# Patient Record
Sex: FEMALE | Race: WHITE | NOT HISPANIC OR LATINO | Employment: OTHER | URBAN - METROPOLITAN AREA
[De-identification: names, ages, dates, MRNs, and addresses within clinical notes are randomized per-mention and may not be internally consistent; named-entity substitution may affect disease eponyms.]

---

## 2023-02-03 PROBLEM — S29.9XXA RIB INJURY: Status: ACTIVE | Noted: 2023-02-03

## 2023-02-03 PROBLEM — L98.9 SKIN LESION OF NECK: Status: ACTIVE | Noted: 2023-02-03

## 2023-02-03 PROBLEM — B37.2 CANDIDAL INTERTRIGO: Status: ACTIVE | Noted: 2023-02-03

## 2023-02-03 PROBLEM — I10 BENIGN ESSENTIAL HYPERTENSION: Status: ACTIVE | Noted: 2023-02-03

## 2023-02-03 PROBLEM — G50.0 TRIGEMINAL NEURALGIA: Status: ACTIVE | Noted: 2023-02-03

## 2023-02-03 PROBLEM — R05.9 COUGH: Status: ACTIVE | Noted: 2023-02-03
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
## 2023-02-03 PROBLEM — H40.9 GLAUCOMA: Status: ACTIVE | Noted: 2023-02-03

## 2023-02-03 PROBLEM — M54.2 NECK PAIN: Status: ACTIVE | Noted: 2023-02-03

## 2023-02-03 PROBLEM — R07.89 ATYPICAL CHEST PAIN: Status: ACTIVE | Noted: 2023-02-03

## 2023-02-03 PROBLEM — M25.519 SHOULDER PAIN: Status: ACTIVE | Noted: 2023-02-03

## 2023-02-03 PROBLEM — L90.5 SCARRING: Status: ACTIVE | Noted: 2023-02-03
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
## 2023-02-03 PROBLEM — G54.2 CERVICAL NEUROPATHY: Status: ACTIVE | Noted: 2023-02-03

## 2023-02-03 PROBLEM — E78.5 HYPERLIPIDEMIA: Status: ACTIVE | Noted: 2023-02-03

## 2023-02-03 PROBLEM — H26.9 RIGHT CATARACT: Status: ACTIVE | Noted: 2023-02-03

## 2023-02-03 PROBLEM — E16.2 HYPOGLYCEMIA: Status: ACTIVE | Noted: 2023-02-03

## 2023-02-03 PROBLEM — M54.2 NECK PAIN ON LEFT SIDE: Status: ACTIVE | Noted: 2023-02-03

## 2023-02-03 PROBLEM — L98.9 NODULAR LESION ON SURFACE OF SKIN: Status: ACTIVE | Noted: 2023-02-03

## 2023-02-03 PROBLEM — M81.0 POSTMENOPAUSAL BONE LOSS: Status: ACTIVE | Noted: 2023-02-03

## 2023-02-03 PROBLEM — I25.10 ASHD (ARTERIOSCLEROTIC HEART DISEASE): Status: ACTIVE | Noted: 2023-02-03

## 2023-02-03 PROBLEM — R94.39 ABNORMAL STRESS TEST: Status: ACTIVE | Noted: 2023-02-03

## 2023-02-03 PROBLEM — M85.80 OSTEOPENIA: Status: ACTIVE | Noted: 2023-02-03

## 2023-02-03 PROBLEM — E55.9 VITAMIN D DEFICIENCY: Status: ACTIVE | Noted: 2023-02-03

## 2023-02-03 PROBLEM — H61.21 HEARING LOSS DUE TO CERUMEN IMPACTION, RIGHT: Status: ACTIVE | Noted: 2023-02-03

## 2023-02-03 PROBLEM — E78.5 HYPERLIPEMIA: Status: ACTIVE | Noted: 2023-02-03

## 2023-02-03 PROBLEM — M81.0 OSTEOPOROSIS: Status: ACTIVE | Noted: 2023-02-03

## 2023-02-03 PROBLEM — H61.23 BILATERAL IMPACTED CERUMEN: Status: ACTIVE | Noted: 2023-02-03

## 2023-02-03 PROBLEM — R73.9 BORDERLINE HYPERGLYCEMIA: Status: ACTIVE | Noted: 2023-02-03

## 2023-02-03 RX ORDER — LATANOPROST 50 UG/ML
SOLUTION/ DROPS OPHTHALMIC
COMMUNITY

## 2023-02-03 RX ORDER — CALCIUM CARBONATE/VITAMIN D3 600MG-5MCG
3 TABLET ORAL DAILY
COMMUNITY
Start: 2017-08-08

## 2023-02-03 RX ORDER — ATORVASTATIN CALCIUM 40 MG/1
1 TABLET, FILM COATED ORAL DAILY
COMMUNITY
Start: 2017-08-21 | End: 2023-03-17 | Stop reason: SDUPTHER

## 2023-02-03 RX ORDER — TIMOLOL MALEATE 5 MG/ML
SOLUTION/ DROPS OPHTHALMIC
COMMUNITY
End: 2024-04-02 | Stop reason: WASHOUT

## 2023-02-03 RX ORDER — AMLODIPINE BESYLATE 5 MG/1
1 TABLET ORAL DAILY
COMMUNITY
Start: 2017-08-21 | End: 2023-03-17 | Stop reason: SDUPTHER

## 2023-02-03 RX ORDER — OMEGA-3 FATTY ACIDS 1000 MG
1000 CAPSULE ORAL DAILY
COMMUNITY

## 2023-02-03 RX ORDER — ALENDRONATE SODIUM 70 MG/1
1 TABLET ORAL
COMMUNITY
Start: 2018-11-05 | End: 2023-08-22 | Stop reason: SDUPTHER

## 2023-03-08 ENCOUNTER — TELEPHONE (OUTPATIENT)
Dept: PRIMARY CARE | Facility: CLINIC | Age: 83
End: 2023-03-08
Payer: MEDICARE

## 2023-03-08 LAB
ALANINE AMINOTRANSFERASE (SGPT) (U/L) IN SER/PLAS: 14 U/L (ref 7–45)
ALBUMIN (G/DL) IN SER/PLAS: 4.1 G/DL (ref 3.4–5)
ALKALINE PHOSPHATASE (U/L) IN SER/PLAS: 77 U/L (ref 33–136)
ANION GAP IN SER/PLAS: 9 MMOL/L (ref 10–20)
ASPARTATE AMINOTRANSFERASE (SGOT) (U/L) IN SER/PLAS: 17 U/L (ref 9–39)
BASOPHILS (10*3/UL) IN BLOOD BY AUTOMATED COUNT: 0.08 X10E9/L (ref 0–0.1)
BASOPHILS/100 LEUKOCYTES IN BLOOD BY AUTOMATED COUNT: 0.9 % (ref 0–2)
BILIRUBIN TOTAL (MG/DL) IN SER/PLAS: 0.6 MG/DL (ref 0–1.2)
CALCIDIOL (25 OH VITAMIN D3) (NG/ML) IN SER/PLAS: 53 NG/ML
CALCIUM (MG/DL) IN SER/PLAS: 9.3 MG/DL (ref 8.6–10.3)
CARBON DIOXIDE, TOTAL (MMOL/L) IN SER/PLAS: 30 MMOL/L (ref 21–32)
CHLORIDE (MMOL/L) IN SER/PLAS: 104 MMOL/L (ref 98–107)
CHOLESTEROL (MG/DL) IN SER/PLAS: 107 MG/DL (ref 0–199)
CHOLESTEROL IN HDL (MG/DL) IN SER/PLAS: 42.6 MG/DL
CHOLESTEROL/HDL RATIO: 2.5
CREATININE (MG/DL) IN SER/PLAS: 0.83 MG/DL (ref 0.5–1.05)
EOSINOPHILS (10*3/UL) IN BLOOD BY AUTOMATED COUNT: 0.19 X10E9/L (ref 0–0.4)
EOSINOPHILS/100 LEUKOCYTES IN BLOOD BY AUTOMATED COUNT: 2.2 % (ref 0–6)
ERYTHROCYTE DISTRIBUTION WIDTH (RATIO) BY AUTOMATED COUNT: 12.4 % (ref 11.5–14.5)
ERYTHROCYTE MEAN CORPUSCULAR HEMOGLOBIN CONCENTRATION (G/DL) BY AUTOMATED: 32.2 G/DL (ref 32–36)
ERYTHROCYTE MEAN CORPUSCULAR VOLUME (FL) BY AUTOMATED COUNT: 100 FL (ref 80–100)
ERYTHROCYTES (10*6/UL) IN BLOOD BY AUTOMATED COUNT: 4.13 X10E12/L (ref 4–5.2)
ESTIMATED AVERAGE GLUCOSE FOR HBA1C: 105 MG/DL
GFR FEMALE: 70 ML/MIN/1.73M2
GLUCOSE (MG/DL) IN SER/PLAS: 90 MG/DL (ref 74–99)
HEMATOCRIT (%) IN BLOOD BY AUTOMATED COUNT: 41.3 % (ref 36–46)
HEMOGLOBIN (G/DL) IN BLOOD: 13.3 G/DL (ref 12–16)
HEMOGLOBIN A1C/HEMOGLOBIN TOTAL IN BLOOD: 5.3 %
IMMATURE GRANULOCYTES/100 LEUKOCYTES IN BLOOD BY AUTOMATED COUNT: 0.3 % (ref 0–0.9)
LDL: 47 MG/DL (ref 0–99)
LEUKOCYTES (10*3/UL) IN BLOOD BY AUTOMATED COUNT: 8.6 X10E9/L (ref 4.4–11.3)
LYMPHOCYTES (10*3/UL) IN BLOOD BY AUTOMATED COUNT: 2.56 X10E9/L (ref 0.8–3)
LYMPHOCYTES/100 LEUKOCYTES IN BLOOD BY AUTOMATED COUNT: 29.7 % (ref 13–44)
MONOCYTES (10*3/UL) IN BLOOD BY AUTOMATED COUNT: 0.63 X10E9/L (ref 0.05–0.8)
MONOCYTES/100 LEUKOCYTES IN BLOOD BY AUTOMATED COUNT: 7.3 % (ref 2–10)
NEUTROPHILS (10*3/UL) IN BLOOD BY AUTOMATED COUNT: 5.12 X10E9/L (ref 1.6–5.5)
NEUTROPHILS/100 LEUKOCYTES IN BLOOD BY AUTOMATED COUNT: 59.6 % (ref 40–80)
PLATELETS (10*3/UL) IN BLOOD AUTOMATED COUNT: 269 X10E9/L (ref 150–450)
POTASSIUM (MMOL/L) IN SER/PLAS: 4.3 MMOL/L (ref 3.5–5.3)
PROTEIN TOTAL: 7 G/DL (ref 6.4–8.2)
SODIUM (MMOL/L) IN SER/PLAS: 139 MMOL/L (ref 136–145)
THYROTROPIN (MIU/L) IN SER/PLAS BY DETECTION LIMIT <= 0.05 MIU/L: 3.42 MIU/L (ref 0.44–3.98)
TRIGLYCERIDE (MG/DL) IN SER/PLAS: 85 MG/DL (ref 0–149)
UREA NITROGEN (MG/DL) IN SER/PLAS: 17 MG/DL (ref 6–23)
VLDL: 17 MG/DL (ref 0–40)

## 2023-03-08 NOTE — TELEPHONE ENCOUNTER
Patient has a follow up on 3/17    Resulted Orders   Comprehensive Metabolic Panel   Result Value Ref Range    Glucose 90 74 - 99 mg/dL    Sodium 139 136 - 145 mmol/L    Potassium 4.3 3.5 - 5.3 mmol/L    Chloride 104 98 - 107 mmol/L    Bicarbonate 30 21 - 32 mmol/L    Anion Gap 9 (L) 10 - 20 mmol/L    Urea Nitrogen 17 6 - 23 mg/dL    Creatinine 0.83 0.50 - 1.05 mg/dL    GFR Female 70 >90 mL/min/1.73m2      Comment:       CALCULATIONS OF ESTIMATED GFR ARE PERFORMED   USING THE 2021 CKD-EPI STUDY REFIT EQUATION   WITHOUT THE RACE VARIABLE FOR THE IDMS-TRACEABLE   CREATININE METHODS.    https://jasn.asnjournals.org/content/early/2021/09/[email or web address]      Calcium 9.3 8.6 - 10.3 mg/dL    Albumin 4.1 3.4 - 5.0 g/dL    Alkaline Phosphatase 77 33 - 136 U/L    Total Protein 7.0 6.4 - 8.2 g/dL    AST 17 9 - 39 U/L    Total Bilirubin 0.6 0.0 - 1.2 mg/dL    ALT (SGPT) 14 7 - 45 U/L      Comment:       Patients treated with Sulfasalazine may generate    falsely decreased results for ALT.

## 2023-03-08 NOTE — TELEPHONE ENCOUNTER
----- Message from JOSE ANTONIO Parker-CNP sent at 3/8/2023  1:09 PM EST -----  Normal blood counts

## 2023-03-17 ENCOUNTER — OFFICE VISIT (OUTPATIENT)
Dept: PRIMARY CARE | Facility: CLINIC | Age: 83
End: 2023-03-17
Payer: MEDICARE

## 2023-03-17 VITALS
BODY MASS INDEX: 23.21 KG/M2 | HEIGHT: 63 IN | DIASTOLIC BLOOD PRESSURE: 72 MMHG | OXYGEN SATURATION: 98 % | RESPIRATION RATE: 16 BRPM | HEART RATE: 73 BPM | WEIGHT: 131 LBS | TEMPERATURE: 97.3 F | SYSTOLIC BLOOD PRESSURE: 120 MMHG

## 2023-03-17 DIAGNOSIS — R30.0 DYSURIA: Primary | ICD-10-CM

## 2023-03-17 DIAGNOSIS — E78.5 HYPERLIPIDEMIA, UNSPECIFIED HYPERLIPIDEMIA TYPE: ICD-10-CM

## 2023-03-17 DIAGNOSIS — I10 PRIMARY HYPERTENSION: ICD-10-CM

## 2023-03-17 DIAGNOSIS — Z12.31 BREAST CANCER SCREENING BY MAMMOGRAM: ICD-10-CM

## 2023-03-17 LAB
POC APPEARANCE, URINE: ABNORMAL
POC BILIRUBIN, URINE: NEGATIVE
POC BLOOD, URINE: NEGATIVE
POC COLOR, URINE: YELLOW
POC GLUCOSE, URINE: NEGATIVE MG/DL
POC KETONES, URINE: NEGATIVE MG/DL
POC LEUKOCYTES, URINE: ABNORMAL
POC NITRITE,URINE: NEGATIVE
POC PH, URINE: 8 PH
POC PROTEIN, URINE: NEGATIVE MG/DL
POC SPECIFIC GRAVITY, URINE: 1.02
POC UROBILINOGEN, URINE: 0.2 EU/DL

## 2023-03-17 PROCEDURE — 1159F MED LIST DOCD IN RCRD: CPT | Performed by: FAMILY MEDICINE

## 2023-03-17 PROCEDURE — 1160F RVW MEDS BY RX/DR IN RCRD: CPT | Performed by: FAMILY MEDICINE

## 2023-03-17 PROCEDURE — 81003 URINALYSIS AUTO W/O SCOPE: CPT | Performed by: FAMILY MEDICINE

## 2023-03-17 PROCEDURE — 3078F DIAST BP <80 MM HG: CPT | Performed by: FAMILY MEDICINE

## 2023-03-17 PROCEDURE — 87086 URINE CULTURE/COLONY COUNT: CPT

## 2023-03-17 PROCEDURE — G0439 PPPS, SUBSEQ VISIT: HCPCS | Performed by: FAMILY MEDICINE

## 2023-03-17 PROCEDURE — 1170F FXNL STATUS ASSESSED: CPT | Performed by: FAMILY MEDICINE

## 2023-03-17 PROCEDURE — 3074F SYST BP LT 130 MM HG: CPT | Performed by: FAMILY MEDICINE

## 2023-03-17 RX ORDER — ATORVASTATIN CALCIUM 40 MG/1
40 TABLET, FILM COATED ORAL DAILY
Qty: 90 TABLET | Refills: 3 | Status: SHIPPED | OUTPATIENT
Start: 2023-03-17 | End: 2023-12-29 | Stop reason: SDUPTHER

## 2023-03-17 RX ORDER — AMLODIPINE BESYLATE 5 MG/1
5 TABLET ORAL DAILY
Qty: 90 TABLET | Refills: 3 | Status: SHIPPED | OUTPATIENT
Start: 2023-03-17 | End: 2023-12-29 | Stop reason: SDUPTHER

## 2023-03-17 RX ORDER — TIMOLOL MALEATE 5 MG/ML
SOLUTION OPHTHALMIC
COMMUNITY
Start: 2023-03-11

## 2023-03-17 ASSESSMENT — ENCOUNTER SYMPTOMS
CHILLS: 0
DEPRESSION: 0
PALPITATIONS: 0
SHORTNESS OF BREATH: 0
HEADACHES: 0
DYSPHORIC MOOD: 0
VOMITING: 0
CONSTIPATION: 0
LOSS OF SENSATION IN FEET: 1
ARTHRALGIAS: 0
FATIGUE: 0
DIZZINESS: 0
COUGH: 0
ABDOMINAL PAIN: 0
SLEEP DISTURBANCE: 1
NAUSEA: 0
FREQUENCY: 1
WEAKNESS: 0
FEVER: 0
BACK PAIN: 0
NERVOUS/ANXIOUS: 0
MYALGIAS: 0
OCCASIONAL FEELINGS OF UNSTEADINESS: 0
DIARRHEA: 0

## 2023-03-17 ASSESSMENT — ACTIVITIES OF DAILY LIVING (ADL)
DRESSING: INDEPENDENT
DOING_HOUSEWORK: INDEPENDENT
GROCERY_SHOPPING: INDEPENDENT
MANAGING_FINANCES: INDEPENDENT
TAKING_MEDICATION: INDEPENDENT
BATHING: INDEPENDENT

## 2023-03-17 ASSESSMENT — PAIN SCALES - GENERAL: PAINLEVEL: 0-NO PAIN

## 2023-03-17 NOTE — PATIENT INSTRUCTIONS
You were seen today for your Medicare Wellness exam    We have attempted to flush your ears today but this was unsuccessful, I recommend you try over the counter Debrox solution to help soften the wax and if needed may return here to attempt to reflush them once wax is softened.     Your urinary frequency seems to be improving, your urine test in office today does not look like you have a urinary tract infection but I will have it sent out for culture just to confirm this    Your blood pressure looks great, I have refilled your amlodipine    Your cholesterol is controlled on your current dose of atorvastatin    I have ordered your screening mammogram    For sleeping difficulties I have recommended that you try magnesium glycinate 200 mg at bedtime.    You have declined recommended preventative vaccines today including pneumonia and shingles    We have discussed your lab work today and everything looks great!    Please return in 6 months for follow up of chronic health conditions

## 2023-03-17 NOTE — PROGRESS NOTES
"Subjective   Reason for Visit: Micaela Leos is an 82 y.o. female here for a Medicare Wellness visit.     Past Medical, Surgical, and Family History reviewed and updated in chart.    Reviewed all medications by prescribing practitioner or clinical pharmacist (such as prescriptions, OTCs, herbal therapies and supplements) and documented in the medical record.    Osteoporosis- on fosamax weekly and is taking calcium/vit d supplement. Dexa due 12/2023    Hypertenion controlled on amlodipine daily    HLD  controlled on atorvastatin    Has complaints of urinary frequency for the past couple of weeks but is improving, denies hematuria, dysuria    Chronic difficulties falling asleep, does not want to try prescription medication at this time and has not tried any OTC supplements in attempts at treating. Denies snoring, HA, daytime somnolence     Feels ears are \"clogged\". Denies otalgia.         Patient Care Team:  JOSE ANTONIO Parker-CNP as PCP - General (Family Medicine)     Review of Systems   Constitutional:  Negative for chills, fatigue and fever.   HENT:  Positive for hearing loss.    Eyes:  Negative for visual disturbance.   Respiratory:  Negative for cough and shortness of breath.    Cardiovascular:  Negative for chest pain and palpitations.   Gastrointestinal:  Negative for abdominal pain, constipation, diarrhea, nausea and vomiting.   Genitourinary:  Positive for frequency.   Musculoskeletal:  Negative for arthralgias, back pain and myalgias.   Skin:  Negative for rash.   Neurological:  Negative for dizziness, syncope, weakness and headaches.   Psychiatric/Behavioral:  Positive for sleep disturbance. Negative for dysphoric mood. The patient is not nervous/anxious.        Objective   Vitals:  /72   Pulse 73   Temp 36.3 °C (97.3 °F)   Resp 16   Ht 1.6 m (5' 3\")   Wt 59.4 kg (131 lb)   SpO2 98%   BMI 23.21 kg/m²       Physical Exam  Constitutional:       Appearance: Normal appearance.   HENT:    "   Head: Normocephalic and atraumatic.   Cardiovascular:      Rate and Rhythm: Normal rate and regular rhythm.      Heart sounds: No murmur heard.     No gallop.   Pulmonary:      Effort: Pulmonary effort is normal. No respiratory distress.      Breath sounds: Normal breath sounds.   Abdominal:      General: Bowel sounds are normal. There is no distension.      Tenderness: There is no abdominal tenderness.   Musculoskeletal:         General: Normal range of motion.   Skin:     General: Skin is warm and dry.      Findings: No lesion or rash.   Neurological:      General: No focal deficit present.      Mental Status: She is alert and oriented to person, place, and time. Mental status is at baseline.   Psychiatric:         Mood and Affect: Mood normal.         Behavior: Behavior normal.         Assessment/Plan   Problem List Items Addressed This Visit          Other    Hyperlipidemia    Relevant Medications    atorvastatin (Lipitor) 40 mg tablet     Other Visit Diagnoses       Dysuria    -  Primary    Relevant Orders    POCT UA Automated manually resulted (Completed)    Urine Culture    Breast cancer screening by mammogram        Relevant Orders    BI mammo bilateral screening tomosynthesis    Primary hypertension        Relevant Medications    amLODIPine (Norvasc) 5 mg tablet

## 2023-03-19 LAB — URINE CULTURE: NORMAL

## 2023-03-21 ENCOUNTER — TELEPHONE (OUTPATIENT)
Dept: PRIMARY CARE | Facility: CLINIC | Age: 83
End: 2023-03-21
Payer: MEDICARE

## 2023-03-21 NOTE — TELEPHONE ENCOUNTER
----- Message from JOSE ANTONIO Parker-CNP sent at 3/21/2023  8:23 AM EDT -----  Urine culture with no growth-negative for UTI

## 2023-04-05 ENCOUNTER — TELEPHONE (OUTPATIENT)
Dept: PRIMARY CARE | Facility: CLINIC | Age: 83
End: 2023-04-05
Payer: MEDICARE

## 2023-04-05 NOTE — TELEPHONE ENCOUNTER
----- Message from NIMESH Parker sent at 4/4/2023  1:16 PM EDT -----  Normal screening mammogram, it is recommended to repeat screening in 1 year

## 2023-04-24 ENCOUNTER — TELEPHONE (OUTPATIENT)
Dept: PRIMARY CARE | Facility: CLINIC | Age: 83
End: 2023-04-24
Payer: MEDICARE

## 2023-08-22 DIAGNOSIS — M81.0 OSTEOPOROSIS WITHOUT CURRENT PATHOLOGICAL FRACTURE, UNSPECIFIED OSTEOPOROSIS TYPE: Primary | ICD-10-CM

## 2023-08-22 RX ORDER — ALENDRONATE SODIUM 70 MG/1
70 TABLET ORAL
Qty: 12 TABLET | Refills: 3 | Status: SHIPPED | OUTPATIENT
Start: 2023-08-22 | End: 2024-03-20 | Stop reason: SDUPTHER

## 2023-09-15 ENCOUNTER — OFFICE VISIT (OUTPATIENT)
Dept: PRIMARY CARE | Facility: CLINIC | Age: 83
End: 2023-09-15
Payer: MEDICARE

## 2023-09-15 VITALS
DIASTOLIC BLOOD PRESSURE: 82 MMHG | SYSTOLIC BLOOD PRESSURE: 130 MMHG | BODY MASS INDEX: 22.75 KG/M2 | HEART RATE: 62 BPM | RESPIRATION RATE: 16 BRPM | HEIGHT: 63 IN | TEMPERATURE: 97.2 F | WEIGHT: 128.4 LBS | OXYGEN SATURATION: 99 %

## 2023-09-15 DIAGNOSIS — M81.0 AGE-RELATED OSTEOPOROSIS WITHOUT CURRENT PATHOLOGICAL FRACTURE: ICD-10-CM

## 2023-09-15 DIAGNOSIS — M46.1 SACROILIITIS, NOT ELSEWHERE CLASSIFIED (CMS-HCC): ICD-10-CM

## 2023-09-15 DIAGNOSIS — E78.2 MIXED HYPERLIPIDEMIA: Primary | ICD-10-CM

## 2023-09-15 DIAGNOSIS — R20.0 FACIAL NUMBNESS: ICD-10-CM

## 2023-09-15 DIAGNOSIS — R09.89 BRUIT OF LEFT CAROTID ARTERY: ICD-10-CM

## 2023-09-15 DIAGNOSIS — I10 BENIGN ESSENTIAL HYPERTENSION: ICD-10-CM

## 2023-09-15 PROCEDURE — 1160F RVW MEDS BY RX/DR IN RCRD: CPT | Performed by: FAMILY MEDICINE

## 2023-09-15 PROCEDURE — 1126F AMNT PAIN NOTED NONE PRSNT: CPT | Performed by: FAMILY MEDICINE

## 2023-09-15 PROCEDURE — 3079F DIAST BP 80-89 MM HG: CPT | Performed by: FAMILY MEDICINE

## 2023-09-15 PROCEDURE — 99214 OFFICE O/P EST MOD 30 MIN: CPT | Performed by: FAMILY MEDICINE

## 2023-09-15 PROCEDURE — 1159F MED LIST DOCD IN RCRD: CPT | Performed by: FAMILY MEDICINE

## 2023-09-15 PROCEDURE — 3075F SYST BP GE 130 - 139MM HG: CPT | Performed by: FAMILY MEDICINE

## 2023-09-15 RX ORDER — MELATONIN 5 MG
CAPSULE ORAL
COMMUNITY

## 2023-09-15 ASSESSMENT — ENCOUNTER SYMPTOMS
OCCASIONAL FEELINGS OF UNSTEADINESS: 0
LOSS OF SENSATION IN FEET: 0

## 2023-09-15 ASSESSMENT — LIFESTYLE VARIABLES
AUDIT-C TOTAL SCORE: 2
HOW MANY STANDARD DRINKS CONTAINING ALCOHOL DO YOU HAVE ON A TYPICAL DAY: 1 OR 2
SKIP TO QUESTIONS 9-10: 1
HOW OFTEN DO YOU HAVE SIX OR MORE DRINKS ON ONE OCCASION: NEVER
HOW OFTEN DO YOU HAVE A DRINK CONTAINING ALCOHOL: 2-4 TIMES A MONTH

## 2023-09-15 ASSESSMENT — VISUAL ACUITY: OU: 1

## 2023-09-15 ASSESSMENT — PATIENT HEALTH QUESTIONNAIRE - PHQ9
1. LITTLE INTEREST OR PLEASURE IN DOING THINGS: NOT AT ALL
2. FEELING DOWN, DEPRESSED OR HOPELESS: NOT AT ALL
SUM OF ALL RESPONSES TO PHQ9 QUESTIONS 1 AND 2: 0

## 2023-09-15 NOTE — PROGRESS NOTES
"Subjective   Patient ID: Micaela Leos is a 83 y.o. female who presents for Follow-up (6 Months).    Presents with daughter whom is assisting with history, has concerns of new symptoms of head/facial numbness and tingling, is intermittent, started about 6 weeks ago, endorses generalized ill feeling when it occurs. No known precipitating factors. Rests to relieve and feels slightly better, occurs daily. Is a smoker with history of CAD and is on statin. Does have occasional headaches but does not seem to correlate with new symptoms and are not new or worse. Denies vision changes, dizziness, lightheadedness, nausea, vomiting.     Osteoporosis- on fosamax weekly and is taking calcium/vit d supplement. Dexa due 12/2023     Hypertenion controlled on amlodipine daily     HLD  controlled on atorvastatin    Has been having lower back pain intermittently and was [facility] taken which show arthritic changes, plans to participate in physical therapy for this. Pain controlled with tylenol as needed.                Review of Systems   All other systems reviewed and are negative.      Objective   /82   Pulse 62   Temp 36.2 °C (97.2 °F) (Temporal)   Resp 16   Ht 1.6 m (5' 3\")   Wt 58.2 kg (128 lb 6.4 oz)   SpO2 99%   BMI 22.75 kg/m²     Physical Exam  Constitutional:       Appearance: Normal appearance.   HENT:      Head: Normocephalic and atraumatic.   Eyes:      General: Lids are normal. Vision grossly intact. Gaze aligned appropriately.      Extraocular Movements:      Right eye: Normal extraocular motion.      Left eye: Normal extraocular motion.   Neck:      Vascular: Carotid bruit (left) present.   Cardiovascular:      Rate and Rhythm: Normal rate and regular rhythm.      Heart sounds: No murmur heard.     No gallop.   Pulmonary:      Effort: Pulmonary effort is normal. No respiratory distress.      Breath sounds: Normal breath sounds.   Abdominal:      General: Bowel sounds are normal. There " is no distension.      Tenderness: There is no abdominal tenderness.   Musculoskeletal:         General: Normal range of motion.   Skin:     General: Skin is warm and dry.      Findings: No lesion or rash.   Neurological:      General: No focal deficit present.      Mental Status: She is alert and oriented to person, place, and time. Mental status is at baseline.   Psychiatric:         Mood and Affect: Mood normal.         Behavior: Behavior normal.         Assessment/Plan   Problem List Items Addressed This Visit       Benign essential hypertension    Relevant Orders    Basic Metabolic Panel    CBC and Auto Differential    TSH with reflex to Free T4 if abnormal    Hyperlipemia - Primary    Relevant Orders    CT cardiac scoring wo IV contrast     Other Visit Diagnoses       Bruit of left carotid artery        Relevant Orders    CT angio neck w and wo IV contrast    Facial numbness        Relevant Orders    CT angio neck w and wo IV contrast

## 2023-09-25 ENCOUNTER — LAB (OUTPATIENT)
Dept: LAB | Facility: LAB | Age: 83
End: 2023-09-25
Payer: MEDICARE

## 2023-09-25 DIAGNOSIS — I10 BENIGN ESSENTIAL HYPERTENSION: ICD-10-CM

## 2023-09-25 LAB
ANION GAP IN SER/PLAS: 12 MMOL/L (ref 10–20)
BASOPHILS (10*3/UL) IN BLOOD BY AUTOMATED COUNT: 0.03 X10E9/L (ref 0–0.1)
BASOPHILS/100 LEUKOCYTES IN BLOOD BY AUTOMATED COUNT: 0.4 % (ref 0–2)
CALCIUM (MG/DL) IN SER/PLAS: 9.8 MG/DL (ref 8.6–10.3)
CARBON DIOXIDE, TOTAL (MMOL/L) IN SER/PLAS: 28 MMOL/L (ref 21–32)
CHLORIDE (MMOL/L) IN SER/PLAS: 104 MMOL/L (ref 98–107)
CREATININE (MG/DL) IN SER/PLAS: 0.64 MG/DL (ref 0.5–1.05)
EOSINOPHILS (10*3/UL) IN BLOOD BY AUTOMATED COUNT: 0.11 X10E9/L (ref 0–0.4)
EOSINOPHILS/100 LEUKOCYTES IN BLOOD BY AUTOMATED COUNT: 1.3 % (ref 0–6)
ERYTHROCYTE DISTRIBUTION WIDTH (RATIO) BY AUTOMATED COUNT: 12.7 % (ref 11.5–14.5)
ERYTHROCYTE MEAN CORPUSCULAR HEMOGLOBIN CONCENTRATION (G/DL) BY AUTOMATED: 31.9 G/DL (ref 32–36)
ERYTHROCYTE MEAN CORPUSCULAR VOLUME (FL) BY AUTOMATED COUNT: 99 FL (ref 80–100)
ERYTHROCYTES (10*6/UL) IN BLOOD BY AUTOMATED COUNT: 4.02 X10E12/L (ref 4–5.2)
GFR FEMALE: 88 ML/MIN/1.73M2
GLUCOSE (MG/DL) IN SER/PLAS: 87 MG/DL (ref 74–99)
HEMATOCRIT (%) IN BLOOD BY AUTOMATED COUNT: 39.8 % (ref 36–46)
HEMOGLOBIN (G/DL) IN BLOOD: 12.7 G/DL (ref 12–16)
IMMATURE GRANULOCYTES/100 LEUKOCYTES IN BLOOD BY AUTOMATED COUNT: 0.5 % (ref 0–0.9)
LEUKOCYTES (10*3/UL) IN BLOOD BY AUTOMATED COUNT: 8.4 X10E9/L (ref 4.4–11.3)
LYMPHOCYTES (10*3/UL) IN BLOOD BY AUTOMATED COUNT: 2.49 X10E9/L (ref 0.8–3)
LYMPHOCYTES/100 LEUKOCYTES IN BLOOD BY AUTOMATED COUNT: 29.5 % (ref 13–44)
MONOCYTES (10*3/UL) IN BLOOD BY AUTOMATED COUNT: 0.72 X10E9/L (ref 0.05–0.8)
MONOCYTES/100 LEUKOCYTES IN BLOOD BY AUTOMATED COUNT: 8.5 % (ref 2–10)
NEUTROPHILS (10*3/UL) IN BLOOD BY AUTOMATED COUNT: 5.04 X10E9/L (ref 1.6–5.5)
NEUTROPHILS/100 LEUKOCYTES IN BLOOD BY AUTOMATED COUNT: 59.8 % (ref 40–80)
PLATELETS (10*3/UL) IN BLOOD AUTOMATED COUNT: 207 X10E9/L (ref 150–450)
POTASSIUM (MMOL/L) IN SER/PLAS: 4.7 MMOL/L (ref 3.5–5.3)
SODIUM (MMOL/L) IN SER/PLAS: 139 MMOL/L (ref 136–145)
THYROTROPIN (MIU/L) IN SER/PLAS BY DETECTION LIMIT <= 0.05 MIU/L: 2.26 MIU/L (ref 0.44–3.98)
UREA NITROGEN (MG/DL) IN SER/PLAS: 8 MG/DL (ref 6–23)

## 2023-09-25 PROCEDURE — 36415 COLL VENOUS BLD VENIPUNCTURE: CPT

## 2023-09-25 PROCEDURE — 84443 ASSAY THYROID STIM HORMONE: CPT

## 2023-09-25 PROCEDURE — 85025 COMPLETE CBC W/AUTO DIFF WBC: CPT

## 2023-09-25 PROCEDURE — 80048 BASIC METABOLIC PNL TOTAL CA: CPT

## 2023-09-26 ENCOUNTER — TELEPHONE (OUTPATIENT)
Dept: PRIMARY CARE | Facility: CLINIC | Age: 83
End: 2023-09-26
Payer: MEDICARE

## 2023-10-05 ENCOUNTER — TREATMENT (OUTPATIENT)
Dept: PHYSICAL THERAPY | Facility: HOSPITAL | Age: 83
End: 2023-10-05
Payer: MEDICARE

## 2023-10-05 DIAGNOSIS — R29.898 WEAKNESS OF BOTH LOWER EXTREMITIES: ICD-10-CM

## 2023-10-05 DIAGNOSIS — R26.9 ABNORMAL GAIT: Primary | ICD-10-CM

## 2023-10-05 DIAGNOSIS — M54.16 LUMBAR RADICULOPATHY: ICD-10-CM

## 2023-10-05 DIAGNOSIS — R29.898 OTHER SYMPTOMS AND SIGNS INVOLVING THE MUSCULOSKELETAL SYSTEM: ICD-10-CM

## 2023-10-05 DIAGNOSIS — R26.9 UNSPECIFIED ABNORMALITIES OF GAIT AND MOBILITY: ICD-10-CM

## 2023-10-05 DIAGNOSIS — M54.16 RADICULOPATHY, LUMBAR REGION: ICD-10-CM

## 2023-10-05 PROCEDURE — 97110 THERAPEUTIC EXERCISES: CPT | Mod: GP,CQ

## 2023-10-05 ASSESSMENT — PAIN SCALES - GENERAL: PAINLEVEL_OUTOF10: 1

## 2023-10-05 ASSESSMENT — PAIN - FUNCTIONAL ASSESSMENT: PAIN_FUNCTIONAL_ASSESSMENT: 0-10

## 2023-10-05 NOTE — PROGRESS NOTES
Physical Therapy    Physical Therapy Treatment    Patient Name: Micaela Leos  MRN: [id number]  Today's Date: 10/5/2023  Time Calculation  Start Time: 0100  Stop Time: 0140  Time Calculation (min): 40 min    Visit #2  Assessment:   Pt tolerated all exercises well with minimal difficulty reporting no increase in pain throughout session. Pt demonstrates good understanding of HEP without questions.     Plan:   Continue with DLS exercises to improve functional abilities with decreased pain     Current Problem  1. Abnormal gait        2. Unspecified abnormalities of gait and mobility  PT eval and treat      3. Other symptoms and signs involving the musculoskeletal system  PT eval and treat      4. Radiculopathy, lumbar region  PT eval and treat      5. Weakness of both lower extremities        6. Lumbar radiculopathy            Subjective   General   Pt states her HEP seems to be helping with her pain. Pt states the lift in her R shoe feels better.  Precautions  Precautions  Precautions Comment: none     Pain  Pain Assessment: 0-10  Pain Score: 1  Pain Location: Back  Pain Orientation: Upper    Objective     Treatments:     EXERCISES Date 10/5/23 Date Date Date    REPS REPS REPS REPS          Nustep Level 2 6min             Shuttle   DLP 4B x10       Shuttle   SLP        2B x10Bil       Shuttle   TR/HR                     Tband   Lat Pulls       Tband   Chops       Tband   Mid Row       Tband   Mower starts              SB  DKTC X10       SB   LTR X10 david              //bars:       3 way hip X10ea David      Mini squats  X10       Marching David  2 laps                                                                        OP EDUCATION:   Reviewed HEP     Goals:  Encounter Problems       Encounter Problems (Active)       PT Problem       PT Goals       Start:  10/05/23    Expected End:  12/05/23       Pt. will present with normalized gait pattern for improved gait safety in 6 weeks    Pt's LE strength will improve to 4+/5  throughout to allow for improved in 6 weeks    Pain will be no greater than 3/10 with functional activities to allow pt. to stand without limitation in 8 weeks       Planned interventions include: gait training, therapeutic activities and therapeutic exercises.   Frequency and duration: 2 time(s) a week, for 8 weeks.   Potential to achieve rehab goals is excellent

## 2023-10-11 ENCOUNTER — TREATMENT (OUTPATIENT)
Dept: PHYSICAL THERAPY | Facility: HOSPITAL | Age: 83
End: 2023-10-11
Payer: MEDICARE

## 2023-10-11 DIAGNOSIS — R29.898 OTHER SYMPTOMS AND SIGNS INVOLVING THE MUSCULOSKELETAL SYSTEM: ICD-10-CM

## 2023-10-11 DIAGNOSIS — R26.9 UNSPECIFIED ABNORMALITIES OF GAIT AND MOBILITY: ICD-10-CM

## 2023-10-11 DIAGNOSIS — M54.16 RADICULOPATHY, LUMBAR REGION: ICD-10-CM

## 2023-10-11 DIAGNOSIS — M54.16 LUMBAR RADICULOPATHY: ICD-10-CM

## 2023-10-11 DIAGNOSIS — R26.9 ABNORMAL GAIT: Primary | ICD-10-CM

## 2023-10-11 DIAGNOSIS — R29.898 WEAKNESS OF BOTH LOWER EXTREMITIES: ICD-10-CM

## 2023-10-11 PROCEDURE — 97110 THERAPEUTIC EXERCISES: CPT | Mod: GP | Performed by: PHYSICAL THERAPIST

## 2023-10-11 ASSESSMENT — PAIN SCALES - GENERAL: PAINLEVEL_OUTOF10: 0 - NO PAIN

## 2023-10-11 ASSESSMENT — PAIN - FUNCTIONAL ASSESSMENT: PAIN_FUNCTIONAL_ASSESSMENT: 0-10

## 2023-10-11 NOTE — PROGRESS NOTES
Physical Therapy    Physical Therapy Treatment    Patient Name: Micaela Leos  MRN: [id number]  Today's Date: 10/11/2023  Time Calculation  Start Time: 0915  Stop Time: 0955  Time Calculation (min): 40 min    Visit #3  Assessment:     Pt. Fatigued with treatment today  Plan:   Progress DLS    Current Problem  1. Abnormal gait        2. Unspecified abnormalities of gait and mobility  PT eval and treat      3. Other symptoms and signs involving the musculoskeletal system  PT eval and treat      4. Radiculopathy, lumbar region  PT eval and treat      5. Weakness of both lower extremities        6. Lumbar radiculopathy            Subjective   General     Precautions  Precautions  Precautions Comment: none  Vital Signs     Pain  Pain Assessment: 0-10  Pain Score: 0 - No pain      Objective   Pt reports no greater than 2-3/10 pain in the past several days    Treatments:  EXERCISES Date 10/5/23 Date 10/11/23 Date Date    REPS REPS REPS REPS          Nustep Level 2 6min  L3 6min            Shuttle   DLP 4B x10  5B  2x10     Shuttle   SLP        2B x10Bil  3B  x10 david     Shuttle   TR/HR                     Tband   Lat Pulls   Red 2x10     Tband   Chops       Tband   Mid Row   Red 2x10     Tband   Mower starts              SB  DKTC X10   x10     SB   LTR X10 david   x10bil            //bars:       3 way hip X10ea David  X10 each david     Mini squats  X10   x10     Marching David  2 laps  2 laps                                                                           Goals:  Start:  10/05/23    Expected End:  12/05/23      Pt. will present with normalized gait pattern for improved gait safety in 6 weeks    Pt's LE strength will improve to 4+/5 throughout to allow for improved in 6 weeks    Pain will be no greater than 3/10 with functional activities to allow pt. to stand without limitation in 8 weeks       Planned interventions include: gait training, therapeutic activities and therapeutic exercises.   Frequency and duration: 2  time(s) a week, for 8 weeks.   Potential to achieve rehab goals is excellent

## 2023-10-12 ENCOUNTER — HOSPITAL ENCOUNTER (OUTPATIENT)
Dept: VASCULAR MEDICINE | Facility: HOSPITAL | Age: 83
Discharge: HOME | End: 2023-10-12
Payer: MEDICARE

## 2023-10-12 DIAGNOSIS — R09.89 BRUIT OF LEFT CAROTID ARTERY: ICD-10-CM

## 2023-10-12 PROCEDURE — 93880 EXTRACRANIAL BILAT STUDY: CPT

## 2023-10-12 PROCEDURE — 93880 EXTRACRANIAL BILAT STUDY: CPT | Performed by: INTERNAL MEDICINE

## 2023-10-13 ENCOUNTER — TREATMENT (OUTPATIENT)
Dept: PHYSICAL THERAPY | Facility: HOSPITAL | Age: 83
End: 2023-10-13
Payer: MEDICARE

## 2023-10-13 DIAGNOSIS — R29.898 OTHER SYMPTOMS AND SIGNS INVOLVING THE MUSCULOSKELETAL SYSTEM: ICD-10-CM

## 2023-10-13 DIAGNOSIS — M54.16 RADICULOPATHY, LUMBAR REGION: ICD-10-CM

## 2023-10-13 DIAGNOSIS — R29.898 WEAKNESS OF BOTH LOWER EXTREMITIES: Primary | ICD-10-CM

## 2023-10-13 DIAGNOSIS — R26.9 ABNORMAL GAIT: ICD-10-CM

## 2023-10-13 DIAGNOSIS — M54.16 LUMBAR RADICULOPATHY: ICD-10-CM

## 2023-10-13 DIAGNOSIS — R26.9 UNSPECIFIED ABNORMALITIES OF GAIT AND MOBILITY: ICD-10-CM

## 2023-10-13 PROCEDURE — 97110 THERAPEUTIC EXERCISES: CPT | Mod: GP,CQ

## 2023-10-13 ASSESSMENT — PAIN - FUNCTIONAL ASSESSMENT: PAIN_FUNCTIONAL_ASSESSMENT: 0-10

## 2023-10-13 NOTE — PROGRESS NOTES
Physical Therapy      Physical Therapy Treatment    Patient Name: Micaela Leos  MRN: [id number]  Today's Date: 10/13/2023  Time Calculation  Start Time: 0945  Stop Time: 1028  Time Calculation (min): 43 min    Visit #4  Assessment:   Pt tolerated all exercises well reporting no pain at end of session.     Plan:   Continue progressing LE strength and endurance for improved functional abilities     Current Problem  1. Weakness of both lower extremities        2. Unspecified abnormalities of gait and mobility  PT eval and treat      3. Other symptoms and signs involving the musculoskeletal system  PT eval and treat      4. Radiculopathy, lumbar region  PT eval and treat      5. Abnormal gait        6. Lumbar radiculopathy            Subjective   General   Pt states after last session she had increased L groin pain.   Precautions  Precautions  Precautions Comment: none  Pain  Pain Assessment: 0-10    Objective     Treatments:     EXERCISES Date 10/5/23 Date 10/11/23 Date 10/13/23 Date    REPS REPS REPS REPS          Nustep Level 2 6min  L3 6min L3 7min           Shuttle   DLP 4B x10  5B  2x10 5B    Shuttle   SLP        2B x10Bil  3B  x10 david 3B    Shuttle   TR/HR                     Tband   Lat Pulls   Red 2x10 Red 2x10    Tband   Chops       Tband   Mid Row   Red 2x10 Red 2x10    Tband   Mower starts              SB  DKTC X10   x10 x10    SB   LTR X10 david   x10bil x10Bil            //bars:       3 way hip X10ea David  X10 each david X10ea David     Mini squats  X10   x10 x10    Marching David  2 laps  2 laps 2 laps                                                                      Goals:  Active       PT Problem       PT Goals       Start:  10/05/23    Expected End:  12/05/23       Pt. will present with normalized gait pattern for improved gait safety in 6 weeks    Pt's LE strength will improve to 4+/5 throughout to allow for improved in 6 weeks    Pain will be no greater than 3/10 with functional activities to allow pt. to  stand without limitation in 8 weeks       Planned interventions include: gait training, therapeutic activities and therapeutic exercises.   Frequency and duration: 2 time(s) a week, for 8 weeks.   Potential to achieve rehab goals is excellent

## 2023-10-14 ENCOUNTER — HOSPITAL ENCOUNTER (OUTPATIENT)
Dept: RADIOLOGY | Facility: HOSPITAL | Age: 83
Discharge: HOME | End: 2023-10-14
Payer: MEDICARE

## 2023-10-14 DIAGNOSIS — R20.0 FACIAL NUMBNESS: ICD-10-CM

## 2023-10-14 PROCEDURE — 70450 CT HEAD/BRAIN W/O DYE: CPT | Mod: MG

## 2023-10-14 PROCEDURE — 70450 CT HEAD/BRAIN W/O DYE: CPT | Performed by: STUDENT IN AN ORGANIZED HEALTH CARE EDUCATION/TRAINING PROGRAM

## 2023-10-17 ENCOUNTER — TREATMENT (OUTPATIENT)
Dept: PHYSICAL THERAPY | Facility: HOSPITAL | Age: 83
End: 2023-10-17
Payer: MEDICARE

## 2023-10-17 DIAGNOSIS — R29.898 OTHER SYMPTOMS AND SIGNS INVOLVING THE MUSCULOSKELETAL SYSTEM: ICD-10-CM

## 2023-10-17 DIAGNOSIS — M54.16 RADICULOPATHY, LUMBAR REGION: ICD-10-CM

## 2023-10-17 DIAGNOSIS — R26.9 UNSPECIFIED ABNORMALITIES OF GAIT AND MOBILITY: ICD-10-CM

## 2023-10-17 PROCEDURE — 97110 THERAPEUTIC EXERCISES: CPT | Mod: GP | Performed by: PHYSICAL THERAPIST

## 2023-10-17 ASSESSMENT — PAIN SCALES - GENERAL: PAINLEVEL_OUTOF10: 0 - NO PAIN

## 2023-10-17 ASSESSMENT — PAIN - FUNCTIONAL ASSESSMENT: PAIN_FUNCTIONAL_ASSESSMENT: 0-10

## 2023-10-17 NOTE — PROGRESS NOTES
Physical Therapy    Physical Therapy Treatment    Patient Name: Micaela Leos  MRN: [id number]  Today's Date: 10/17/2023  Time Calculation  Start Time: 0900  Stop Time: 0930  Time Calculation (min): 30 min      Assessment:   All goals achieved with the exception of hip flexion strength    Plan:   Discharge with HEP    Current Problem  1. Unspecified abnormalities of gait and mobility  PT eval and treat      2. Other symptoms and signs involving the musculoskeletal system  PT eval and treat      3. Radiculopathy, lumbar region  PT eval and treat          Subjective   Pt. Reports no pain and reports that she wishes to be discharged with HEP     Precautions  Precautions  Precautions Comment: none  Vital Signs     Pain  Pain Assessment: 0-10  Pain Score: 0 - No pain    Objective     Lelia LE MMT WNL throughout with the exception of hip flexion at 4/5/lelia  Gait pattern normalized  Outcome Measures:  Other Measures  Oswestry Disablity Index (VINCE): 0Oswestry = 0     Treatments:  EXERCISES Date 10/5/23 Date 10/11/23 Date 10/13/23 Date 10/17/23    REPS REPS REPS REPS   Visit #     5   Nustep Level 2 6min  L3 6min L3 7min  L3 8 min          Shuttle   DLP 4B x10  5B  2x10 5B    Shuttle   SLP        2B x10Bil  3B  x10 lelia 3B    Shuttle   TR/HR                     Tband   Lat Pulls   Red 2x10 Red 2x10    Tband   Chops       Tband   Mid Row   Red 2x10 Red 2x10    Tband   Mower starts              SB  DKTC X10   x10 x10    SB   LTR X10 lelia   x10bil x10Bil            //bars:       3 way hip X10ea Lelia  X10 each lelia X10ea Lelia     Mini squats  X10   x10 x10    Marching Lelia  2 laps  2 laps 2 laps                                                                    Access Code: [email or web address]  URL: [email or web address]  Date: 10/17/2023  Prepared by: Navi Franks    Exercises  - Mini Squat with Counter Support  - 2 x daily - 7 x weekly - 2 sets - 10 reps - 2 seconds hold  - Standing Hip Abduction  - 2 x daily - 7 x weekly -  2 sets - 10 reps - 2 seconds hold  - Standing Hip Extension  - 2 x daily - 7 x weekly - 2 sets - 10 reps - 2 seconds hold  - Standing Hip Flexion AROM  - 2 x daily - 7 x weekly - 2 sets - 10 reps - 2 seconds hold       Goals:  Active       PT Problem       PT Goals       Start:  10/05/23    Expected End:  12/05/23       Pt. will present with normalized gait pattern for improved gait safety in 6 weeks    Pt's LE strength will improve to 4+/5 throughout to allow for improved in 6 weeks    Pain will be no greater than 3/10 with functional activities to allow pt. to stand without limitation in 8 weeks       Planned interventions include: gait training, therapeutic activities and therapeutic exercises.   Frequency and duration: 2 time(s) a week, for 8 weeks.   Potential to achieve rehab goals is excellent

## 2023-10-18 NOTE — RESULT ENCOUNTER NOTE
Less then 50% stenosis (blockage) in bilateral carotid arteries. I recommend lifestyle measures to improve cholesterol which include keeping active. Healthy diet low in fat, low cholesterol and maintaining a healthy weight for height (BMI).  Also continue taking atorvastatin.

## 2023-10-20 ENCOUNTER — TELEPHONE (OUTPATIENT)
Dept: PRIMARY CARE | Facility: CLINIC | Age: 83
End: 2023-10-20
Payer: MEDICARE

## 2023-10-26 NOTE — PROGRESS NOTES
Counseling:  The patient was counseled regarding diagnostic results, instructions for management, risk factor reductions, prognosis, patient and family education, impressions, risks and benefits of treatment options and importance of compliance with treatment.      Chief Complaint:  The patient presents today to re-establish cardiovascular care for the continued management of CAD.      History Of Present Illness:    Micaela Leos is an 83 y.o. female patient whose PMH is significant for CAD, HTN, hyperlipidemia, cervical neuropathy, and trigeminal neuralgia. She is a former patient of Dr. Storey, last being seen in 2018, who presents today to re-establish cardiovascular care for the continued management of CAD. The patient states that she recently had an x-ray of her spine that showed aortic calcifications. LHC performed in 2017 showed mild to moderate coronary artery disease. The patient also had a carotid duplex performed on 10/12/2023 which showed findings consistent with less than 50% stenosis bilaterally. She denies any CP, chest discomfort or SOB. EKG today shows NSR with no acute changes. She is currently on amlodipine for management of HTN, which has been stable.     Past Surgical History:  She has a past surgical history that includes Cataract extraction (08/08/2017); Tonsillectomy (02/20/2014); Lumbar fusion (02/20/2014); and Appendectomy (02/20/2014).      Social History:  She reports that she has been smoking cigarettes. She has been smoking an average of .5 packs per day. She has never used smokeless tobacco. She reports current alcohol use of about 4.0 standard drinks of alcohol per week. She reports that she does not use drugs.    Family History:  Family History   Problem Relation Name Age of Onset    No Known Problems Mother      Other (cardiac disorder) Father      Coronary artery disease Father      Other (CABG) Father          Allergies:  Patient has no known allergies.    Outpatient  "Medications:  Current Outpatient Medications   Medication Instructions    alendronate (FOSAMAX) 70 mg, oral, Weekly    amLODIPine (NORVASC) 5 mg, oral, Daily    ASPIRIN ORAL oral, (81 MG TABS)    atorvastatin (LIPITOR) 40 mg, oral, Daily    calcium carbonate-vitamin D3 600 mg-5 mcg (200 unit) tablet 3 tablets, oral, Daily    glucosamine/chondro myers A/C/Mn (GLUCOSAMINE-CHONDROITIN COMPLX ORAL) 1 tablet, oral, Daily    latanoprost (Xalatan) 0.005 % ophthalmic solution ophthalmic (eye)    melatonin 5 mg capsule oral    omega-3 1,000 mg, oral, Daily    timolol (Timoptic) 0.5 % ophthalmic solution ophthalmic (eye)    timolol (Timoptic-XR) 0.5 % ophthalmic gel-forming instill 1 drop into both eyes every morning    vits A,C,E/lutein/minerals (OCUVITE WITH LUTEIN ORAL) oral        Last Recorded Vitals:  Vitals:    10/30/23 1006   BP: 130/72   BP Location: Left arm   Pulse: 59   Weight: 59 kg (130 lb)   Height: 1.6 m (5' 3\")       Review of Systems   All other systems reviewed and are negative.     Physical Exam:  Constitutional:       Appearance: Healthy appearance. Not in distress.   Neck:      Vascular: No JVR. JVD normal.   Pulmonary:      Effort: Pulmonary effort is normal.      Breath sounds: Normal breath sounds. No wheezing. No rhonchi. No rales.   Chest:      Chest wall: Not tender to palpatation.   Cardiovascular:      PMI at left midclavicular line. Normal rate. Regular rhythm. Normal S1. Normal S2.       Murmurs: There is no murmur.      No gallop.  No click. No rub.   Pulses:     Intact distal pulses.   Edema:     Peripheral edema absent.   Abdominal:      General: Bowel sounds are normal.      Palpations: Abdomen is soft.      Tenderness: There is no abdominal tenderness.   Musculoskeletal: Normal range of motion.         General: No tenderness. Skin:     General: Skin is warm and dry.   Neurological:      General: No focal deficit present.      Mental Status: Alert and oriented to person, place and time.      "   Last Labs:  CBC -  Lab Results   Component Value Date    WBC 8.4 09/25/2023    HGB 12.7 09/25/2023    HCT 39.8 09/25/2023    MCV 99 09/25/2023     09/25/2023       CMP -  Lab Results   Component Value Date    CALCIUM 9.8 09/25/2023    PROT 7.0 03/08/2023    ALBUMIN 4.1 03/08/2023    AST 17 03/08/2023    ALT 14 03/08/2023    ALKPHOS 77 03/08/2023    BILITOT 0.6 03/08/2023       LIPID PANEL -   Lab Results   Component Value Date    CHOL 107 03/08/2023    TRIG 85 03/08/2023    HDL 42.6 03/08/2023    CHHDL 2.5 03/08/2023    LDLF 47 03/08/2023    VLDL 17 03/08/2023       RENAL FUNCTION PANEL -   Lab Results   Component Value Date    GLUCOSE 87 09/25/2023     09/25/2023    K 4.7 09/25/2023     09/25/2023    CO2 28 09/25/2023    ANIONGAP 12 09/25/2023    BUN 8 09/25/2023    CREATININE 0.64 09/25/2023    CALCIUM 9.8 09/25/2023    ALBUMIN 4.1 03/08/2023        Lab Results   Component Value Date    HGBA1C 5.3 03/08/2023       Last Cardiology Tests:  08/21/2017 - Cardiac Catheterization (LH)  1. Mild to moderate coronary artery disease.  2. Normal LV systolic function.    07/21/2017 - Stress Test  1. Abnormal stress echocardiogram due to LAD territory ischemia at a low workload. Note: Hypertensive blood pressure response may reduce specificity of the exam.  2. Maximum Predicted Heart Rate: 90%. METs: 5.  3. Blood Pressure Response: Hypertensive.  4. Electrocardiogram Findings: Normal.  5. Test Ended Due To: Patient fatigue.  6. Stress Provoked: No symptoms.     Lab review: I have personally reviewed the laboratory result(s).  Diagnostic review: I have personally reviewed the result(s) of the [facility] from 2017.    Assessment/Plan   1) CAD  [facility] 08/2017 with mild to moderate mild to moderate CAD  On ASA and atorvastatin 40 mg daily    Lipid panel 03/2023 with LDL of 47  Per patient, a recent x-ray of her spine showed aortic calcifications  Carotid duplex 10/12/2023 with less than 50% stenosis bilaterally  Denies  CP, chest discomfort or SOB  EKG shows NSR with no acute changes  F/U 1 year     2) HTN  Stable  On amlodipine 5 mg daily      Scribe Attestation  By signing my name below, I, Jesus Lynn   attest that this documentation has been prepared under the direction and in the presence of Geronimo Tomlinson MD.

## 2023-10-30 ENCOUNTER — OFFICE VISIT (OUTPATIENT)
Dept: CARDIOLOGY | Facility: CLINIC | Age: 83
End: 2023-10-30
Payer: MEDICARE

## 2023-10-30 VITALS
HEIGHT: 63 IN | HEART RATE: 59 BPM | SYSTOLIC BLOOD PRESSURE: 130 MMHG | WEIGHT: 130 LBS | DIASTOLIC BLOOD PRESSURE: 72 MMHG | BODY MASS INDEX: 23.04 KG/M2

## 2023-10-30 DIAGNOSIS — I25.10 ASHD (ARTERIOSCLEROTIC HEART DISEASE): Primary | ICD-10-CM

## 2023-10-30 PROCEDURE — 1126F AMNT PAIN NOTED NONE PRSNT: CPT | Performed by: INTERNAL MEDICINE

## 2023-10-30 PROCEDURE — 3078F DIAST BP <80 MM HG: CPT | Performed by: INTERNAL MEDICINE

## 2023-10-30 PROCEDURE — 1160F RVW MEDS BY RX/DR IN RCRD: CPT | Performed by: INTERNAL MEDICINE

## 2023-10-30 PROCEDURE — 99203 OFFICE O/P NEW LOW 30 MIN: CPT | Performed by: INTERNAL MEDICINE

## 2023-10-30 PROCEDURE — 3075F SYST BP GE 130 - 139MM HG: CPT | Performed by: INTERNAL MEDICINE

## 2023-10-30 PROCEDURE — 1159F MED LIST DOCD IN RCRD: CPT | Performed by: INTERNAL MEDICINE

## 2023-10-30 PROCEDURE — 93000 ELECTROCARDIOGRAM COMPLETE: CPT | Performed by: INTERNAL MEDICINE

## 2023-10-30 ASSESSMENT — ENCOUNTER SYMPTOMS
OCCASIONAL FEELINGS OF UNSTEADINESS: 0
LOSS OF SENSATION IN FEET: 0
DEPRESSION: 0

## 2023-10-30 NOTE — PATIENT INSTRUCTIONS
Continue all current medications as prescribed.   Followup with Dr. Tomlinson in 1 year, sooner should any issues or concerns arise before then.     If you have any questions or cardiac concerns, please call our office at [phone].

## 2023-10-30 NOTE — LETTER
October 30, 2023     Jeni Payton, APRN-CNP  [id number] [facility] [id number]    Patient: Micaela Leos   YOB: 1940   Date of Visit: 10/30/2023       Dear Dr. Jeni Payton, APRN-CNP:    Thank you for referring Micaela Leos to me for evaluation. Below are my notes for this consultation.  If you have questions, please do not hesitate to call me. I look forward to following your patient along with you.       Sincerely,     Geronimo Tomlinson MD      CC: No Recipients  ______________________________________________________________________________________    Counseling:  The patient was counseled regarding diagnostic results, instructions for management, risk factor reductions, prognosis, patient and family education, impressions, risks and benefits of treatment options and importance of compliance with treatment.      Chief Complaint:  The patient presents today to re-establish cardiovascular care for the continued management of CAD.      History Of Present Illness:    Micaela Leos is an 83 y.o. female patient whose PMH is significant for CAD, HTN, hyperlipidemia, cervical neuropathy, and trigeminal neuralgia. She is a former patient of Dr. Storey, last being seen in 2018, who presents today to re-establish cardiovascular care for the continued management of CAD. The patient states that she recently had an x-ray of her spine that showed aortic calcifications. LHC performed in 2017 showed mild to moderate coronary artery disease. The patient also had a carotid duplex performed on 10/12/2023 which showed findings consistent with less than 50% stenosis bilaterally. She denies any CP, chest discomfort or SOB. EKG today shows NSR with no acute changes. She is currently on amlodipine for management of HTN, which has been stable.     Past Surgical History:  She has a past surgical history that includes Cataract extraction (08/08/2017); Tonsillectomy (02/20/2014); Lumbar fusion  "(02/20/2014); and Appendectomy (02/20/2014).      Social History:  She reports that she has been smoking cigarettes. She has been smoking an average of .5 packs per day. She has never used smokeless tobacco. She reports current alcohol use of about 4.0 standard drinks of alcohol per week. She reports that she does not use drugs.    Family History:  Family History   Problem Relation Name Age of Onset   • No Known Problems Mother     • Other (cardiac disorder) Father     • Coronary artery disease Father     • Other (CABG) Father          Allergies:  Patient has no known allergies.    Outpatient Medications:  Current Outpatient Medications   Medication Instructions   • alendronate (FOSAMAX) 70 mg, oral, Weekly   • amLODIPine (NORVASC) 5 mg, oral, Daily   • ASPIRIN ORAL oral, (81 MG TABS)   • atorvastatin (LIPITOR) 40 mg, oral, Daily   • calcium carbonate-vitamin D3 600 mg-5 mcg (200 unit) tablet 3 tablets, oral, Daily   • glucosamine/chondro myers A/C/Mn (GLUCOSAMINE-CHONDROITIN COMPLX ORAL) 1 tablet, oral, Daily   • latanoprost (Xalatan) 0.005 % ophthalmic solution ophthalmic (eye)   • melatonin 5 mg capsule oral   • omega-3 1,000 mg, oral, Daily   • timolol (Timoptic) 0.5 % ophthalmic solution ophthalmic (eye)   • timolol (Timoptic-XR) 0.5 % ophthalmic gel-forming instill 1 drop into both eyes every morning   • vits A,C,E/lutein/minerals (OCUVITE WITH LUTEIN ORAL) oral        Last Recorded Vitals:  Vitals:    10/30/23 1006   BP: 130/72   BP Location: Left arm   Pulse: 59   Weight: 59 kg (130 lb)   Height: 1.6 m (5' 3\")       Review of Systems   All other systems reviewed and are negative.     Physical Exam:  Constitutional:       Appearance: Healthy appearance. Not in distress.   Neck:      Vascular: No JVR. JVD normal.   Pulmonary:      Effort: Pulmonary effort is normal.      Breath sounds: Normal breath sounds. No wheezing. No rhonchi. No rales.   Chest:      Chest wall: Not tender to palpatation.   Cardiovascular: "      PMI at left midclavicular line. Normal rate. Regular rhythm. Normal S1. Normal S2.       Murmurs: There is no murmur.      No gallop.  No click. No rub.   Pulses:     Intact distal pulses.   Edema:     Peripheral edema absent.   Abdominal:      General: Bowel sounds are normal.      Palpations: Abdomen is soft.      Tenderness: There is no abdominal tenderness.   Musculoskeletal: Normal range of motion.         General: No tenderness. Skin:     General: Skin is warm and dry.   Neurological:      General: No focal deficit present.      Mental Status: Alert and oriented to person, place and time.        Last Labs:  CBC -  Lab Results   Component Value Date    WBC 8.4 09/25/2023    HGB 12.7 09/25/2023    HCT 39.8 09/25/2023    MCV 99 09/25/2023     09/25/2023       CMP -  Lab Results   Component Value Date    CALCIUM 9.8 09/25/2023    PROT 7.0 03/08/2023    ALBUMIN 4.1 03/08/2023    AST 17 03/08/2023    ALT 14 03/08/2023    ALKPHOS 77 03/08/2023    BILITOT 0.6 03/08/2023       LIPID PANEL -   Lab Results   Component Value Date    CHOL 107 03/08/2023    TRIG 85 03/08/2023    HDL 42.6 03/08/2023    CHHDL 2.5 03/08/2023    LDLF 47 03/08/2023    VLDL 17 03/08/2023       RENAL FUNCTION PANEL -   Lab Results   Component Value Date    GLUCOSE 87 09/25/2023     09/25/2023    K 4.7 09/25/2023     09/25/2023    CO2 28 09/25/2023    ANIONGAP 12 09/25/2023    BUN 8 09/25/2023    CREATININE 0.64 09/25/2023    CALCIUM 9.8 09/25/2023    ALBUMIN 4.1 03/08/2023        Lab Results   Component Value Date    HGBA1C 5.3 03/08/2023       Last Cardiology Tests:  08/21/2017 - Cardiac Catheterization (LH)  1. Mild to moderate coronary artery disease.  2. Normal LV systolic function.    07/21/2017 - Stress Test  1. Abnormal stress echocardiogram due to LAD territory ischemia at a low workload. Note: Hypertensive blood pressure response may reduce specificity of the exam.  2. Maximum Predicted Heart Rate: 90%. METs:  5.  3. Blood Pressure Response: Hypertensive.  4. Electrocardiogram Findings: Normal.  5. Test Ended Due To: Patient fatigue.  6. Stress Provoked: No symptoms.     Lab review: I have personally reviewed the laboratory result(s).  Diagnostic review: I have personally reviewed the result(s) of the [facility] from 2017.    Assessment/Plan  1) CAD  [facility] 08/2017 with mild to moderate mild to moderate CAD  On ASA and atorvastatin 40 mg daily    Lipid panel 03/2023 with LDL of 47  Per patient, a recent x-ray of her spine showed aortic calcifications  Carotid duplex 10/12/2023 with less than 50% stenosis bilaterally  Denies CP, chest discomfort or SOB  EKG shows NSR with no acute changes  F/U 1 year     2) HTN  Stable  On amlodipine 5 mg daily      Scribe Attestation  By signing my name below, IKasey Scribe   attest that this documentation has been prepared under the direction and in the presence of Geronimo Tomlinson MD.

## 2023-12-05 ENCOUNTER — APPOINTMENT (OUTPATIENT)
Dept: PRIMARY CARE | Facility: CLINIC | Age: 83
End: 2023-12-05
Payer: MEDICARE

## 2023-12-12 ENCOUNTER — APPOINTMENT (OUTPATIENT)
Dept: RADIOLOGY | Facility: CLINIC | Age: 83
End: 2023-12-12
Payer: MEDICARE

## 2023-12-12 ENCOUNTER — ANCILLARY PROCEDURE (OUTPATIENT)
Dept: RADIOLOGY | Facility: CLINIC | Age: 83
End: 2023-12-12
Payer: MEDICARE

## 2023-12-12 DIAGNOSIS — M81.0 AGE-RELATED OSTEOPOROSIS WITHOUT CURRENT PATHOLOGICAL FRACTURE: ICD-10-CM

## 2023-12-12 PROCEDURE — 77080 DXA BONE DENSITY AXIAL: CPT

## 2023-12-12 PROCEDURE — 77080 DXA BONE DENSITY AXIAL: CPT | Performed by: RADIOLOGY

## 2023-12-27 NOTE — RESULT ENCOUNTER NOTE
Bone density shows improvement in bone density of low back but worsening in left hip, will continue fosamax and repeat screening in 2 years

## 2023-12-29 ENCOUNTER — OFFICE VISIT (OUTPATIENT)
Dept: PRIMARY CARE | Facility: CLINIC | Age: 83
End: 2023-12-29
Payer: MEDICARE

## 2023-12-29 VITALS
WEIGHT: 127.2 LBS | HEIGHT: 63 IN | OXYGEN SATURATION: 98 % | HEART RATE: 60 BPM | BODY MASS INDEX: 22.54 KG/M2 | SYSTOLIC BLOOD PRESSURE: 131 MMHG | DIASTOLIC BLOOD PRESSURE: 83 MMHG

## 2023-12-29 DIAGNOSIS — E78.5 HYPERLIPIDEMIA, UNSPECIFIED HYPERLIPIDEMIA TYPE: ICD-10-CM

## 2023-12-29 DIAGNOSIS — Z00.00 HEALTHCARE MAINTENANCE: Primary | ICD-10-CM

## 2023-12-29 DIAGNOSIS — I10 PRIMARY HYPERTENSION: ICD-10-CM

## 2023-12-29 PROCEDURE — 3079F DIAST BP 80-89 MM HG: CPT | Performed by: FAMILY MEDICINE

## 2023-12-29 PROCEDURE — 99213 OFFICE O/P EST LOW 20 MIN: CPT | Performed by: FAMILY MEDICINE

## 2023-12-29 PROCEDURE — 1126F AMNT PAIN NOTED NONE PRSNT: CPT | Performed by: FAMILY MEDICINE

## 2023-12-29 PROCEDURE — 3075F SYST BP GE 130 - 139MM HG: CPT | Performed by: FAMILY MEDICINE

## 2023-12-29 PROCEDURE — 1159F MED LIST DOCD IN RCRD: CPT | Performed by: FAMILY MEDICINE

## 2023-12-29 PROCEDURE — 1160F RVW MEDS BY RX/DR IN RCRD: CPT | Performed by: FAMILY MEDICINE

## 2023-12-29 RX ORDER — ATORVASTATIN CALCIUM 40 MG/1
40 TABLET, FILM COATED ORAL DAILY
Qty: 90 TABLET | Refills: 3 | Status: SHIPPED | OUTPATIENT
Start: 2023-12-29 | End: 2024-03-20 | Stop reason: SDUPTHER

## 2023-12-29 RX ORDER — AMLODIPINE BESYLATE 5 MG/1
5 TABLET ORAL DAILY
Qty: 90 TABLET | Refills: 3 | Status: SHIPPED | OUTPATIENT
Start: 2023-12-29 | End: 2024-03-20 | Stop reason: SDUPTHER

## 2023-12-29 NOTE — PROGRESS NOTES
"Subjective   Patient ID: Micaela Leos is a 83 y.o. female who presents for Follow-up (Stomach and bowel concerns, Bone density results).    Bone density results reviewed     One month ago noticed changes in bowel movements. Started to notice constipation with larger stool with difficulty to pass followed by runny stools a couple of times throughout the day. Endorses bloating. Denies blood in stool.  Denies abdominal pain, rectal pain. Has tried treating with OTC anti-diarrheal but did not notice much difference. Is gradually improving. Special k for breakfast and has started consuming ensure in the morning. Throughout the day does eat vegetables and fruits, with a protein. Drinks mostly water and occasionally milk. Has an appointment with gastro 1/5. No prior colon cancer screening on file.          Review of Systems   All other systems reviewed and are negative.      Objective   /83 (BP Location: Left arm, Patient Position: Sitting)   Pulse 60   Ht 1.6 m (5' 3\")   Wt 57.7 kg (127 lb 3.2 oz)   SpO2 98%   BMI 22.53 kg/m²     Physical Exam  Constitutional:       Appearance: Normal appearance.   HENT:      Head: Normocephalic and atraumatic.   Cardiovascular:      Rate and Rhythm: Normal rate and regular rhythm.      Heart sounds: No murmur heard.     No gallop.   Pulmonary:      Effort: Pulmonary effort is normal. No respiratory distress.      Breath sounds: Normal breath sounds.   Abdominal:      General: Bowel sounds are normal. There is no distension.      Tenderness: There is no abdominal tenderness.   Musculoskeletal:         General: Normal range of motion.   Skin:     General: Skin is warm and dry.      Findings: No lesion or rash.   Neurological:      General: No focal deficit present.      Mental Status: She is alert and oriented to person, place, and time. Mental status is at baseline.   Psychiatric:         Mood and Affect: Mood normal.         Behavior: Behavior normal.         Assessment/Plan "   Problem List Items Addressed This Visit             ICD-10-CM    Hyperlipidemia E78.5    Relevant Medications    atorvastatin (Lipitor) 40 mg tablet     Other Visit Diagnoses         Codes    Healthcare maintenance    -  Primary Z00.00    Relevant Orders    Follow Up In [facility] - PCP - Health Maintenance    Primary hypertension     I10    Relevant Medications    amLODIPine (Norvasc) 5 mg tablet                Acceptable eye movement; lids with acceptable appearance and movement; conjunctiva clear; iris acceptable shape and color; cornea clear; pupils equally round and react to light. Pupil red reflexes present and equal.

## 2024-01-05 ENCOUNTER — OFFICE VISIT (OUTPATIENT)
Dept: GASTROENTEROLOGY | Facility: CLINIC | Age: 84
End: 2024-01-05
Payer: MEDICARE

## 2024-01-05 VITALS
SYSTOLIC BLOOD PRESSURE: 119 MMHG | BODY MASS INDEX: 23 KG/M2 | OXYGEN SATURATION: 96 % | HEART RATE: 65 BPM | HEIGHT: 62 IN | WEIGHT: 125 LBS | DIASTOLIC BLOOD PRESSURE: 79 MMHG

## 2024-01-05 DIAGNOSIS — R19.4 CHANGE IN BOWEL HABITS: Primary | ICD-10-CM

## 2024-01-05 DIAGNOSIS — R63.4 UNEXPLAINED WEIGHT LOSS: ICD-10-CM

## 2024-01-05 PROCEDURE — 1126F AMNT PAIN NOTED NONE PRSNT: CPT | Performed by: PHYSICIAN ASSISTANT

## 2024-01-05 PROCEDURE — 99204 OFFICE O/P NEW MOD 45 MIN: CPT | Performed by: PHYSICIAN ASSISTANT

## 2024-01-05 PROCEDURE — 3074F SYST BP LT 130 MM HG: CPT | Performed by: PHYSICIAN ASSISTANT

## 2024-01-05 PROCEDURE — 3078F DIAST BP <80 MM HG: CPT | Performed by: PHYSICIAN ASSISTANT

## 2024-01-05 PROCEDURE — 1159F MED LIST DOCD IN RCRD: CPT | Performed by: PHYSICIAN ASSISTANT

## 2024-01-05 NOTE — ASSESSMENT & PLAN NOTE
Over the past 2 months, patient has had a change in bowel habits. She was having diarrhea, now more constipated and passing skinny stools. Discussed with patient that colonoscopy recommended to rule out polyp or malignancy. We discussed that with her age, endoscopy itself and anesthesia complications are increased. She verbalized understanding and wishes to proceed. She does request miralax bowel prep as she believes she cannot tolerate golytely.

## 2024-01-05 NOTE — ASSESSMENT & PLAN NOTE
Patient has lost 5-10 pounds unintentionally in the past few months. EGD and colonoscopy recommended to rule out malignancy. Patient is agreeable.

## 2024-01-05 NOTE — PATIENT INSTRUCTIONS
Thank you for coming in for your appointment.    -Get EGD and colonoscopy done due to change in bowel habits and weight loss  -Start daily fiber supplement, such as metamucil or benefiber    Call with questions or concerns.

## 2024-01-05 NOTE — H&P (VIEW-ONLY)
Subjective   Patient ID: Micaela Leos is a 83 y.o. female who was referred by her PCP for Diarrhea (Labs sept 2023 - has had issues for about 2 months, having bloating, urgency/Very hard stool the last 10 days,/No previous procedures ).    Patient's PCP is TOM Payton    PMH: remote CAD, HLD, HTN    HPI  Patient is accompanied by her daughter who is a cardiac nurse. Patient states that she is concerned because she is having a change in bowel habits. About 2 months ago, she started having diarrhea. She states that she would have a large loose bowel movement and then multiple liquid bowel movements. She cannot identify any new medication, lifestyle changes. She denied recent antibiotic use. She states that the stools were accompanied by bloating. Currently, the diarrhea has improved and now she is only having 2 BM daily with straining to go, but still not back to normal. In the past several months, she has unintentionally lost between 5 and 10 pounds. She started drinking boost/ensure. She denies dysphagia, heartburn, N/V, abdominal pain, melena, hematochezia. She has had no endoscopy in the past. Denies any known family history of GI malignancy.    Prior GI evaluation:  None    Review of Systems:  Constitutional: Reports 5-10 pound unintentional weight loss.  Skin: No reported icterus, lesions, or rash.  Eye: No reported itching, pain, vision changes.  Ear: No reported discharge, hearing loss, or pain.  Nose: No reported congestion, discharge, or epistaxis.  Mouth/throat: No reported dysphagia, hoarseness, or throat pain.  Resp: No reported cough, dyspnea, or wheezing.  Cardiovascular: No reported chest pain, lower extremity edema, or palpitations.   GI: +change in bowel habits  : No reported dysuria, hematuria, or frequency.  Neuro: No reported confusion, memory loss, headaches, or dizziness.  Psych: No reported anxiety, depression, or insomnia.  Musculoskeletal: No reported arthralgia, joint swelling, or  myalgias.  Heme/lymph: No reported easy bleeding or bruising, or swollen lymph nodes.  Endocrine: No reported cold/heat intolerance, polydipsia, or polyuria.     Medications:  Prior to Admission medications    Medication Sig Start Date End Date Taking? Authorizing Provider   alendronate (Fosamax) 70 mg tablet Take 1 tablet (70 mg) by mouth 1 (one) time per week. 8/22/23 8/21/24 Yes NIMESH Parker   amLODIPine (Norvasc) 5 mg tablet Take 1 tablet (5 mg) by mouth once daily. 12/29/23 12/28/24 Yes NIMESH Parker   ASPIRIN ORAL Take by mouth. (81 MG TABS)   Yes Historical Provider, MD   atorvastatin (Lipitor) 40 mg tablet Take 1 tablet (40 mg) by mouth once daily. 12/29/23 12/28/24 Yes NIMESH Parker   calcium carbonate-vitamin D3 600 mg-5 mcg (200 unit) tablet Take 3 tablets by mouth once daily. 8/8/17  Yes Historical Provider, MD   glucosamine/chondro myers A/C/Mn (GLUCOSAMINE-CHONDROITIN COMPLX ORAL) Take 1 tablet by mouth 1 (one) time each day. 8/13/18  Yes Historical Provider, MD   latanoprost (Xalatan) 0.005 % ophthalmic solution Administer into affected eye(s).   Yes Historical Provider, MD   melatonin 5 mg capsule Take by mouth.   Yes Historical Provider, MD   omega-3 1,000 mg capsule capsule Take 1 capsule (1,000 mg) by mouth once daily.   Yes Historical Provider, MD   timolol (Timoptic) 0.5 % ophthalmic solution Administer into affected eye(s).   Yes Historical Provider, MD   timolol (Timoptic-XR) 0.5 % ophthalmic gel-forming instill 1 drop into both eyes every morning 3/11/23  Yes Historical Provider, MD   vits A,C,E/lutein/minerals (OCUVITE WITH LUTEIN ORAL) Take by mouth.   Yes Historical Provider, MD       Allergies:  Patient has no known allergies.    Past Medical History:  She has no past medical history on file.    Past Surgical History:  She has a past surgical history that includes Cataract extraction (08/08/2017); Tonsillectomy (02/20/2014); Lumbar  "fusion (02/20/2014); and Appendectomy (02/20/2014).    Social History:  She reports that she has been smoking cigarettes. She has been smoking an average of .5 packs per day. She has never used smokeless tobacco. She reports current alcohol use of about 4.0 standard drinks of alcohol per week. She reports that she does not use drugs.    Objective   Physical exam:  Constitutional: Well developed, well nourished 83 y.o. year old in no acute distress.   Skin: Warm and dry. Normal turgor. No rash, ulcer, trauma, jaundice.   Eyes: Pupils symmetric and reactive to light.  Respiratory: Clear to auscultation bilaterally. No wheezes, rhonchi, or rales heard.  Cardiovascular: Regular rate and rhythm. S1 and S2 appreciated and normal. No murmur, rub, or gallop heard.   Edema: No edema noted.  GI: Normal bowel sounds. Soft, non-distended, non-tender. No rebound or guarding present. No hepatomegaly or splenomegaly appreciated. Abdominal aorta not palpably abnormal.  Musculoskeletal: Limbs and Joints grossly normal. Full range of motion in major joint.   Neuro: Alert and oriented x 3. Cranial nerves 2-12 grossly intact.   Psych: Normal mood and affect.        Relevant Results Recent labs reviewed in the EMR.  Lab Results   Component Value Date    HGB 12.7 09/25/2023    HGB 13.3 03/08/2023    HGB 13.3 03/17/2022    MCV 99 09/25/2023     03/08/2023    MCV 99 03/17/2022     09/25/2023     03/08/2023     03/17/2022       No results found for: \"FERRITIN\", \"IRON\"    Lab Results   Component Value Date     09/25/2023    K 4.7 09/25/2023     09/25/2023    BUN 8 09/25/2023    CREATININE 0.64 09/25/2023       Lab Results   Component Value Date    BILITOT 0.6 03/08/2023     Lab Results   Component Value Date    ALT 14 03/08/2023    ALT 14 03/17/2022    ALT 14 03/09/2021    AST 17 03/08/2023    AST 16 03/17/2022    AST 18 03/09/2021    ALKPHOS 77 03/08/2023    ALKPHOS 75 03/17/2022    ALKPHOS 79 " "03/09/2021       No results found for: \"CRP\"    No results found for: \"CALPS\"    Radiology: Reviewed imaging reviewed in the EMR.      Assessment/Plan   Problem List Items Addressed This Visit             ICD-10-CM    Change in bowel habits - Primary R19.4     Over the past 2 months, patient has had a change in bowel habits. She was having diarrhea, now more constipated and passing skinny stools. Discussed with patient that colonoscopy recommended to rule out polyp or malignancy. We discussed that with her age, endoscopy itself and anesthesia complications are increased. She verbalized understanding and wishes to proceed. She does request miralax bowel prep as she believes she cannot tolerate golytely.         Relevant Orders    Colonoscopy Diagnostic    EGD    Unexplained weight loss R63.4     Patient has lost 5-10 pounds unintentionally in the past few months. EGD and colonoscopy recommended to rule out malignancy. Patient is agreeable.         Relevant Orders    Colonoscopy Diagnostic    EGD         Mary Ann Mcneal PA-C    "

## 2024-01-22 ENCOUNTER — ANESTHESIA EVENT (OUTPATIENT)
Dept: OPERATING ROOM | Facility: HOSPITAL | Age: 84
End: 2024-01-22
Payer: MEDICARE

## 2024-01-26 ENCOUNTER — HOSPITAL ENCOUNTER (OUTPATIENT)
Dept: OPERATING ROOM | Facility: HOSPITAL | Age: 84
Discharge: HOME | End: 2024-01-26
Payer: MEDICARE

## 2024-01-26 ENCOUNTER — ANESTHESIA (OUTPATIENT)
Dept: OPERATING ROOM | Facility: HOSPITAL | Age: 84
End: 2024-01-26
Payer: MEDICARE

## 2024-01-26 ENCOUNTER — HOSPITAL ENCOUNTER (OUTPATIENT)
Dept: CARDIOLOGY | Facility: HOSPITAL | Age: 84
Discharge: HOME | End: 2024-01-26
Payer: MEDICARE

## 2024-01-26 VITALS
DIASTOLIC BLOOD PRESSURE: 73 MMHG | SYSTOLIC BLOOD PRESSURE: 106 MMHG | OXYGEN SATURATION: 100 % | HEART RATE: 60 BPM | RESPIRATION RATE: 20 BRPM | TEMPERATURE: 98.3 F

## 2024-01-26 DIAGNOSIS — R63.4 UNEXPLAINED WEIGHT LOSS: ICD-10-CM

## 2024-01-26 DIAGNOSIS — R19.4 CHANGE IN BOWEL HABITS: ICD-10-CM

## 2024-01-26 PROCEDURE — 3600000007 HC OR TIME - EACH INCREMENTAL 1 MINUTE - PROCEDURE LEVEL TWO: Performed by: INTERNAL MEDICINE

## 2024-01-26 PROCEDURE — 3600000002 HC OR TIME - INITIAL BASE CHARGE - PROCEDURE LEVEL TWO: Performed by: INTERNAL MEDICINE

## 2024-01-26 PROCEDURE — 2500000004 HC RX 250 GENERAL PHARMACY W/ HCPCS (ALT 636 FOR OP/ED): Performed by: NURSE ANESTHETIST, CERTIFIED REGISTERED

## 2024-01-26 PROCEDURE — 3700000001 HC GENERAL ANESTHESIA TIME - INITIAL BASE CHARGE: Performed by: INTERNAL MEDICINE

## 2024-01-26 PROCEDURE — 88305 TISSUE EXAM BY PATHOLOGIST: CPT | Performed by: PATHOLOGY

## 2024-01-26 PROCEDURE — 2500000005 HC RX 250 GENERAL PHARMACY W/O HCPCS: Performed by: NURSE ANESTHETIST, CERTIFIED REGISTERED

## 2024-01-26 PROCEDURE — 7100000010 HC PHASE TWO TIME - EACH INCREMENTAL 1 MINUTE: Performed by: INTERNAL MEDICINE

## 2024-01-26 PROCEDURE — 2500000004 HC RX 250 GENERAL PHARMACY W/ HCPCS (ALT 636 FOR OP/ED): Performed by: ANESTHESIOLOGY

## 2024-01-26 PROCEDURE — 7100000009 HC PHASE TWO TIME - INITIAL BASE CHARGE: Performed by: INTERNAL MEDICINE

## 2024-01-26 PROCEDURE — 3700000002 HC GENERAL ANESTHESIA TIME - EACH INCREMENTAL 1 MINUTE: Performed by: INTERNAL MEDICINE

## 2024-01-26 PROCEDURE — 0753T DGTZ GLS MCRSCP SLD LEVEL IV: CPT | Mod: TC,SUR,PORLAB,MUE | Performed by: INTERNAL MEDICINE

## 2024-01-26 PROCEDURE — 45380 COLONOSCOPY AND BIOPSY: CPT | Performed by: INTERNAL MEDICINE

## 2024-01-26 PROCEDURE — 43239 EGD BIOPSY SINGLE/MULTIPLE: CPT | Performed by: INTERNAL MEDICINE

## 2024-01-26 PROCEDURE — 93005 ELECTROCARDIOGRAM TRACING: CPT | Mod: 59

## 2024-01-26 PROCEDURE — 93010 ELECTROCARDIOGRAM REPORT: CPT | Performed by: INTERNAL MEDICINE

## 2024-01-26 RX ORDER — SODIUM CHLORIDE, SODIUM LACTATE, POTASSIUM CHLORIDE, CALCIUM CHLORIDE 600; 310; 30; 20 MG/100ML; MG/100ML; MG/100ML; MG/100ML
50 INJECTION, SOLUTION INTRAVENOUS CONTINUOUS
Status: DISCONTINUED | OUTPATIENT
Start: 2024-01-26 | End: 2024-01-27 | Stop reason: HOSPADM

## 2024-01-26 RX ORDER — PROPOFOL 10 MG/ML
INJECTION, EMULSION INTRAVENOUS AS NEEDED
Status: DISCONTINUED | OUTPATIENT
Start: 2024-01-26 | End: 2024-01-26

## 2024-01-26 RX ORDER — FAMOTIDINE 10 MG/ML
20 INJECTION INTRAVENOUS ONCE
Status: COMPLETED | OUTPATIENT
Start: 2024-01-26 | End: 2024-01-26

## 2024-01-26 RX ORDER — PHENYLEPHRINE HCL IN 0.9% NACL 1 MG/10 ML
SYRINGE (ML) INTRAVENOUS AS NEEDED
Status: DISCONTINUED | OUTPATIENT
Start: 2024-01-26 | End: 2024-01-26

## 2024-01-26 RX ORDER — LOPERAMIDE HYDROCHLORIDE 2 MG/1
2 CAPSULE ORAL 2 TIMES DAILY PRN
Qty: 120 CAPSULE | Refills: 3 | Status: SHIPPED | OUTPATIENT
Start: 2024-01-26

## 2024-01-26 RX ORDER — LIDOCAINE HCL/PF 100 MG/5ML
SYRINGE (ML) INTRAVENOUS AS NEEDED
Status: DISCONTINUED | OUTPATIENT
Start: 2024-01-26 | End: 2024-01-26

## 2024-01-26 RX ADMIN — PROPOFOL 50 MG: 10 INJECTION, EMULSION INTRAVENOUS at 09:21

## 2024-01-26 RX ADMIN — PROPOFOL 40 MG: 10 INJECTION, EMULSION INTRAVENOUS at 09:17

## 2024-01-26 RX ADMIN — PROPOFOL 30 MG: 10 INJECTION, EMULSION INTRAVENOUS at 09:14

## 2024-01-26 RX ADMIN — PROPOFOL 30 MG: 10 INJECTION, EMULSION INTRAVENOUS at 09:08

## 2024-01-26 RX ADMIN — FAMOTIDINE 20 MG: 10 INJECTION, SOLUTION INTRAVENOUS at 07:28

## 2024-01-26 RX ADMIN — Medication 100 MCG: at 09:15

## 2024-01-26 RX ADMIN — PROPOFOL 70 MG: 10 INJECTION, EMULSION INTRAVENOUS at 09:05

## 2024-01-26 RX ADMIN — SODIUM CHLORIDE, POTASSIUM CHLORIDE, SODIUM LACTATE AND CALCIUM CHLORIDE 50 ML/HR: 600; 310; 30; 20 INJECTION, SOLUTION INTRAVENOUS at 07:29

## 2024-01-26 RX ADMIN — LIDOCAINE HYDROCHLORIDE 100 MG: 20 INJECTION INTRAVENOUS at 09:05

## 2024-01-26 RX ADMIN — PROPOFOL 30 MG: 10 INJECTION, EMULSION INTRAVENOUS at 09:11

## 2024-01-26 SDOH — HEALTH STABILITY: MENTAL HEALTH: CURRENT SMOKER: 1

## 2024-01-26 ASSESSMENT — PAIN SCALES - GENERAL
PAINLEVEL_OUTOF10: 0 - NO PAIN
PAINLEVEL_OUTOF10: 0 - NO PAIN
PAIN_LEVEL: 0
PAINLEVEL_OUTOF10: 0 - NO PAIN
PAINLEVEL_OUTOF10: 0 - NO PAIN

## 2024-01-26 ASSESSMENT — PAIN - FUNCTIONAL ASSESSMENT
PAIN_FUNCTIONAL_ASSESSMENT: 0-10

## 2024-01-26 ASSESSMENT — COLUMBIA-SUICIDE SEVERITY RATING SCALE - C-SSRS
2. HAVE YOU ACTUALLY HAD ANY THOUGHTS OF KILLING YOURSELF?: NO
6. HAVE YOU EVER DONE ANYTHING, STARTED TO DO ANYTHING, OR PREPARED TO DO ANYTHING TO END YOUR LIFE?: NO
1. IN THE PAST MONTH, HAVE YOU WISHED YOU WERE DEAD OR WISHED YOU COULD GO TO SLEEP AND NOT WAKE UP?: NO

## 2024-01-26 NOTE — ANESTHESIA POSTPROCEDURE EVALUATION
Patient: Micaela Leos    Procedure Summary       Date: 01/26/24 Room / Location: [facility] OR    Anesthesia Start: 0903 Anesthesia Stop: 0927    Procedures:       COLONOSCOPY      EGD Diagnosis:       Change in bowel habits      Unexplained weight loss    Scheduled Providers: Sandeep Reyes DO Responsible Provider: DOMINIC Sanabria    Anesthesia Type: MAC ASA Status: 3            Anesthesia Type: MAC    Vitals Value Taken Time   /73 01/26/24 0951   Temp 36.8 °C (98.3 °F) 01/26/24 0945   Pulse 60 01/26/24 0952   Resp 15 01/26/24 0953   SpO2 96 % 01/26/24 0953   Vitals shown include unvalidated device data.    Anesthesia Post Evaluation    Patient location during evaluation: PACU  Patient participation: complete - patient participated  Level of consciousness: awake and alert  Pain score: 0  Pain management: adequate  Airway patency: patent  Cardiovascular status: acceptable  Respiratory status: acceptable  Hydration status: acceptable  Postoperative Nausea and Vomiting: none    No notable events documented.

## 2024-01-26 NOTE — ANESTHESIA PREPROCEDURE EVALUATION
Patient: Micaela Leos    Procedure Information       Date/Time: 01/26/24 0900    Scheduled providers: Sandeep Reyes DO    Procedures:       COLONOSCOPY      EGD    Location: [facility] OR            Relevant Problems   Cardiovascular   (+) ASHD (arteriosclerotic heart disease)   (+) Atypical chest pain   (+) Benign essential hypertension   (+) Hyperlipemia   (+) Hyperlipidemia      Neuro/Psych   (+) Cervical neuropathy   (+) Lumbar radiculopathy   (+) Trigeminal neuralgia      Eyes, Ears, Nose, and Throat   (+) Glaucoma   (+) Hearing loss due to cerumen impaction, right      Infectious Disease   (+) Candidal intertrigo      Other   (+) Sacroiliitis, not elsewhere classified (CMS/HCC)       Clinical information reviewed:   Tobacco  Allergies  Meds   Med Hx  Surg Hx   Fam Hx  Soc Hx        NPO Detail:  NPO/Void Status  Date of Last Liquid: 01/26/24  Time of Last Liquid: 0200  Date of Last Solid: 01/25/24  Time of Last Solid: 2230  Last Intake Type: Clear fluids  Time of Last Void: 0716         Physical Exam    Airway  Mallampati: II  TM distance: <3 FB     Cardiovascular    Dental    Pulmonary   Breath sounds clear to auscultation     Abdominal      Other findings: Bottom left few missing teeth          Anesthesia Plan    History of general anesthesia?: yes  History of complications of general anesthesia?: no    ASA 3     MAC     The patient is a current smoker.    intravenous induction   Anesthetic plan and risks discussed with patient.    Plan discussed with CRNA.

## 2024-01-27 LAB
ATRIAL RATE: 74 BPM
P AXIS: 63 DEGREES
PR INTERVAL: 165 MS
Q ONSET: 253 MS
QRS COUNT: 11 BEATS
QRS DURATION: 109 MS
QT INTERVAL: 390 MS
QTC CALCULATION(BAZETT): 430 MS
QTC FREDERICIA: 416 MS
R AXIS: -6 DEGREES
T AXIS: -11 DEGREES
T OFFSET: 448 MS
VENTRICULAR RATE: 73 BPM

## 2024-01-29 ENCOUNTER — TELEPHONE (OUTPATIENT)
Dept: PRIMARY CARE | Facility: CLINIC | Age: 84
End: 2024-01-29
Payer: MEDICARE

## 2024-01-30 DIAGNOSIS — R30.0 DYSURIA: Primary | ICD-10-CM

## 2024-01-30 NOTE — TELEPHONE ENCOUNTER
Please call patient when lab has been ordered. She isn't feeling well and would like to have done as soon as possible.Thanks!

## 2024-01-31 ENCOUNTER — LAB (OUTPATIENT)
Dept: LAB | Facility: LAB | Age: 84
End: 2024-01-31
Payer: MEDICARE

## 2024-01-31 DIAGNOSIS — R30.0 DYSURIA: ICD-10-CM

## 2024-01-31 LAB
APPEARANCE UR: ABNORMAL
BILIRUB UR STRIP.AUTO-MCNC: NEGATIVE MG/DL
COLOR UR: YELLOW
GLUCOSE UR STRIP.AUTO-MCNC: NEGATIVE MG/DL
KETONES UR STRIP.AUTO-MCNC: NEGATIVE MG/DL
LABORATORY COMMENT REPORT: NORMAL
LEUKOCYTE ESTERASE UR QL STRIP.AUTO: ABNORMAL
NITRITE UR QL STRIP.AUTO: POSITIVE
PATH REPORT.FINAL DX SPEC: NORMAL
PATH REPORT.GROSS SPEC: NORMAL
PATH REPORT.RELEVANT HX SPEC: NORMAL
PATH REPORT.TOTAL CANCER: NORMAL
PH UR STRIP.AUTO: 7 [PH]
PROT UR STRIP.AUTO-MCNC: ABNORMAL MG/DL
RBC # UR STRIP.AUTO: ABNORMAL /UL
RBC #/AREA URNS AUTO: ABNORMAL /HPF
RENAL EPI CELLS #/AREA UR COMP ASSIST: ABNORMAL /HPF
SP GR UR STRIP.AUTO: 1
SQUAMOUS #/AREA URNS AUTO: ABNORMAL /HPF
UROBILINOGEN UR STRIP.AUTO-MCNC: <2 MG/DL
WBC #/AREA URNS AUTO: >50 /HPF
WBC CLUMPS #/AREA URNS AUTO: ABNORMAL /HPF

## 2024-01-31 PROCEDURE — 87186 SC STD MICRODIL/AGAR DIL: CPT

## 2024-01-31 PROCEDURE — 87086 URINE CULTURE/COLONY COUNT: CPT

## 2024-01-31 PROCEDURE — 81001 URINALYSIS AUTO W/SCOPE: CPT

## 2024-02-01 DIAGNOSIS — N30.01 ACUTE CYSTITIS WITH HEMATURIA: Primary | ICD-10-CM

## 2024-02-01 RX ORDER — NITROFURANTOIN 25; 75 MG/1; MG/1
100 CAPSULE ORAL 2 TIMES DAILY
Qty: 10 CAPSULE | Refills: 0 | Status: SHIPPED | OUTPATIENT
Start: 2024-02-01 | End: 2024-02-06

## 2024-02-03 LAB — BACTERIA UR CULT: ABNORMAL

## 2024-02-07 NOTE — RESULT ENCOUNTER NOTE
Patient called in regarding Xarelto asking to talk to nurse stating she doesn't have any meds. Please call her at [phone]    Susceptibility to Macrobid used to treat confirmed

## 2024-02-20 ENCOUNTER — OFFICE VISIT (OUTPATIENT)
Dept: GASTROENTEROLOGY | Facility: CLINIC | Age: 84
End: 2024-02-20
Payer: MEDICARE

## 2024-02-20 VITALS
OXYGEN SATURATION: 96 % | DIASTOLIC BLOOD PRESSURE: 62 MMHG | SYSTOLIC BLOOD PRESSURE: 108 MMHG | BODY MASS INDEX: 22.68 KG/M2 | HEART RATE: 73 BPM | WEIGHT: 124 LBS

## 2024-02-20 DIAGNOSIS — R89.7 ABNORMAL BIOPSY RESULT: Primary | ICD-10-CM

## 2024-02-20 DIAGNOSIS — R63.4 UNEXPLAINED WEIGHT LOSS: ICD-10-CM

## 2024-02-20 PROCEDURE — 1123F ACP DISCUSS/DSCN MKR DOCD: CPT | Performed by: PHYSICIAN ASSISTANT

## 2024-02-20 PROCEDURE — 3074F SYST BP LT 130 MM HG: CPT | Performed by: PHYSICIAN ASSISTANT

## 2024-02-20 PROCEDURE — 1157F ADVNC CARE PLAN IN RCRD: CPT | Performed by: PHYSICIAN ASSISTANT

## 2024-02-20 PROCEDURE — 1126F AMNT PAIN NOTED NONE PRSNT: CPT | Performed by: PHYSICIAN ASSISTANT

## 2024-02-20 PROCEDURE — 1160F RVW MEDS BY RX/DR IN RCRD: CPT | Performed by: PHYSICIAN ASSISTANT

## 2024-02-20 PROCEDURE — 3078F DIAST BP <80 MM HG: CPT | Performed by: PHYSICIAN ASSISTANT

## 2024-02-20 PROCEDURE — 1159F MED LIST DOCD IN RCRD: CPT | Performed by: PHYSICIAN ASSISTANT

## 2024-02-20 PROCEDURE — 99213 OFFICE O/P EST LOW 20 MIN: CPT | Performed by: PHYSICIAN ASSISTANT

## 2024-02-20 NOTE — ASSESSMENT & PLAN NOTE
Weight is stable today. EGD and colonoscopy without cause. She is drinking boost/protein drinks. If weight loss continues, consider CT imaging of abdomen.

## 2024-02-20 NOTE — ASSESSMENT & PLAN NOTE
Patient's random colon biopsies with mild increase in intraepithelial lymphocytes. This could represent microscopic colitis. Patient with 2 soft BM daily. We discussed close monitoring vs trial of budesonide. As patient is not having significant diarrhea at this time, will monitor. If her stools become loose again, will prescribe budesonide.

## 2024-02-20 NOTE — PROGRESS NOTES
Subjective   Patient ID: Micaela Leos is a 83 y.o. female who presents for Follow-up.    Patient's PCP is TOM Payton    PMH: remote CAD, HLD, HTN    HPI  Patient is accompanied by her daughter. Patient initially saw me in January 2024 for change in bowel habits. She was having diarrhea, but that had improved. She had excessive gas, which again was improved. She had unintentionally lost 5-10 pounds.     Patient underwent EGD and colonoscopy on 1/26/24. EGD showed mild gastritis, biopsies negative for H. Pylori. Colonoscopy showed only diverticulosis and hemorrhoids. Random biopsies were obtained that showed some mild nonspecific increase in intraepithelial lymphocytes. She states that she is not having significant diarrhea, having 2 mostly formed BM daily, occasionally will have slightly loose stool. No further significant weight loss. Denies N/V, melena, hematochezia.     Prior GI evaluation:  EGD and colonoscopy January 2024- see HPI    Review of Systems:  Constitutional: Weight is stable  GI: No significant diarrhea      Medications:  Prior to Admission medications    Medication Sig Start Date End Date Taking? Authorizing Provider   alendronate (Fosamax) 70 mg tablet Take 1 tablet (70 mg) by mouth 1 (one) time per week. 8/22/23 8/21/24 Yes NIMESH Parker   amLODIPine (Norvasc) 5 mg tablet Take 1 tablet (5 mg) by mouth once daily. 12/29/23 12/28/24 Yes NIMESH Parker   ASPIRIN ORAL Take by mouth. (81 MG TABS)   Yes Historical Provider, MD   atorvastatin (Lipitor) 40 mg tablet Take 1 tablet (40 mg) by mouth once daily. 12/29/23 12/28/24 Yes NIMESH Parker   calcium carbonate-vitamin D3 600 mg-5 mcg (200 unit) tablet Take 3 tablets by mouth once daily. 8/8/17  Yes Historical Provider, MD   glucosamine/chondro myers A/C/Mn (GLUCOSAMINE-CHONDROITIN COMPLX ORAL) Take 1 tablet by mouth 1 (one) time each day. 8/13/18  Yes Historical Provider, MD   latanoprost  (Xalatan) 0.005 % ophthalmic solution Administer into affected eye(s).   Yes Historical Provider, MD   melatonin 5 mg capsule Take by mouth.   Yes Historical Provider, MD   omega-3 1,000 mg capsule capsule Take 1 capsule (1,000 mg) by mouth once daily.   Yes Historical Provider, MD   timolol (Timoptic) 0.5 % ophthalmic solution Administer into affected eye(s).   Yes Historical Provider, MD   timolol (Timoptic-XR) 0.5 % ophthalmic gel-forming instill 1 drop into both eyes every morning 3/11/23  Yes Historical Provider, MD   vits A,C,E/lutein/minerals (OCUVITE WITH LUTEIN ORAL) Take by mouth.   Yes Historical Provider, MD       Allergies:  Patient has no known allergies.    Past Medical History:  She has no past medical history on file.    Past Surgical History:  She has a past surgical history that includes Cataract extraction (08/08/2017); Tonsillectomy (02/20/2014); Lumbar fusion (02/20/2014); and Appendectomy (02/20/2014).    Social History:  She reports that she has been smoking cigarettes. She has been smoking an average of .5 packs per day. She has never used smokeless tobacco. She reports current alcohol use of about 4.0 standard drinks of alcohol per week. She reports that she does not use drugs.    Objective   Physical exam:  Constitutional: Well developed, well nourished 83 y.o. year old in no acute distress.   Skin: Warm and dry. Normal turgor. No rash, ulcer, trauma, jaundice.   Eyes: Pupils symmetric and reactive to light.  Respiratory: Clear to auscultation bilaterally. No wheezes, rhonchi, or rales heard.  Cardiovascular: Regular rate and rhythm. S1 and S2 appreciated and normal. No murmur, rub, or gallop heard.   Edema: No edema noted.  GI: Normal bowel sounds. Soft, non-distended, non-tender. No rebound or guarding present. No hepatomegaly or splenomegaly appreciated. Abdominal aorta not palpably abnormal.  Musculoskeletal: Limbs and Joints grossly normal. Full range of motion in major joint.  "  Neuro: Alert and oriented x 3. Cranial nerves 2-12 grossly intact.   Psych: Normal mood and affect.        Relevant Results Recent labs reviewed in the EMR.  Lab Results   Component Value Date    HGB 12.7 09/25/2023    HGB 13.3 03/08/2023    HGB 13.3 03/17/2022    MCV 99 09/25/2023     03/08/2023    MCV 99 03/17/2022     09/25/2023     03/08/2023     03/17/2022       No results found for: \"FERRITIN\", \"IRON\"    Lab Results   Component Value Date     09/25/2023    K 4.7 09/25/2023     09/25/2023    BUN 8 09/25/2023    CREATININE 0.64 09/25/2023       Lab Results   Component Value Date    BILITOT 0.6 03/08/2023     Lab Results   Component Value Date    ALT 14 03/08/2023    ALT 14 03/17/2022    ALT 14 03/09/2021    AST 17 03/08/2023    AST 16 03/17/2022    AST 18 03/09/2021    ALKPHOS 77 03/08/2023    ALKPHOS 75 03/17/2022    ALKPHOS 79 03/09/2021       No results found for: \"CRP\"    No results found for: \"CALPS\"    Radiology: Reviewed imaging reviewed in the EMR.      Assessment/Plan   Problem List Items Addressed This Visit             ICD-10-CM    Unexplained weight loss - Primary R63.4     Weight is stable today. EGD and colonoscopy without cause. She is drinking boost/protein drinks. If weight loss continues, consider CT imaging of abdomen.         Abnormal biopsy result R89.7     Patient's random colon biopsies with mild increase in intraepithelial lymphocytes. This could represent microscopic colitis. Patient with 2 soft BM daily. We discussed close monitoring vs trial of budesonide. As patient is not having significant diarrhea at this time, will monitor. If her stools become loose again, will prescribe budesonide.            Mary Ann Mcneal PA-C    "

## 2024-02-20 NOTE — PATIENT INSTRUCTIONS
Thank you for coming in for your appointment.    -Your colonoscopy showed some increased inflammation, which is nonspecific. Call if you develop significant diarrhea  -Follow up as needed

## 2024-02-26 NOTE — PROGRESS NOTES
[facility]  Outpatient Clinic Note    Patient: Micaela Leos  Age/Sex: 83 y.o., female  MRN: [id number]  Referred by: self     Chief Complaint:  urinary urgency and frequency         History of Present Illness  This is a 83 y.o. female,  presents to the office as a new patient for urinary urgency and frequency that has been going on for the last few months. She gets up 3-5 times a night to go to the bathroom. She admits to having urge urinary incontinence, it happens rarely and she is not bothered by it. She was treated for a UTI in January growing E coli by her PCP. She stated she has symptoms or urgency frequency and dysuria today. She denies PAU, gross hematuria, abdominal pain, pelvic pain, fever or chills. The patient drinks 2 cups of coffee and glass of orange juice, glass of water and ensure protein. Denies drinking a lot before bed. The patient denies history of snoring or sleep apnea. She reports her bowel movements were more on the softer side, she had a colonoscopy in December for this. She stated there was some inflammation. Her bowel movements have been better with diet. She had 4 vaginal births. Denies a history of pelvic surgeries. Denies PMB. She is a current smoker, she has smoked for 30 years 1/2 pack a day. Denies a history of breast cancer.           Gyn History:  - Menopausal: Yes           Postmenopausal bleeding: No  - Hysterectomy: No  - Pap up to date: Yes    History of abnormal pap: No  - Sexually active:  No  Dyspareunia: No   Other issues: none  - Number of prior vaginal deliveries: 4  - Number of prior c-sections: 0    - Mammogram up to date: Yes - 2023  - Colonoscopy up to date: Yes - 2023      Past Medical & Surgical History  No past medical history on file.  Past Surgical History:   Procedure Laterality Date    APPENDECTOMY  2014    Appendectomy    CATARACT EXTRACTION  2017    Cataract Surgery    LUMBAR FUSION  2014    Lumbar Vertebral Fusion     TONSILLECTOMY  02/20/2014    Tonsillectomy       Family History  Family History   Problem Relation Name Age of Onset    No Known Problems Mother      Other (cardiac disorder) Father      Coronary artery disease Father      Other (CABG) Father         Social History  She reports that she has been smoking cigarettes. She has been smoking an average of .5 packs per day. She has never used smokeless tobacco. She reports current alcohol use of about 4.0 standard drinks of alcohol per week. She reports that she does not use drugs.    Allergies  Patient has no known allergies.    Medications:  Current Outpatient Medications on File Prior to Visit   Medication Sig Dispense Refill    alendronate (Fosamax) 70 mg tablet Take 1 tablet (70 mg) by mouth 1 (one) time per week. 12 tablet 3    amLODIPine (Norvasc) 5 mg tablet Take 1 tablet (5 mg) by mouth once daily. 90 tablet 3    ASPIRIN ORAL Take by mouth. (81 MG TABS)      atorvastatin (Lipitor) 40 mg tablet Take 1 tablet (40 mg) by mouth once daily. 90 tablet 3    calcium carbonate-vitamin D3 600 mg-5 mcg (200 unit) tablet Take 3 tablets by mouth once daily.      glucosamine/chondro myers A/C/Mn (GLUCOSAMINE-CHONDROITIN COMPLX ORAL) Take 1 tablet by mouth 1 (one) time each day.      latanoprost (Xalatan) 0.005 % ophthalmic solution Administer into affected eye(s).      loperamide (Imodium) 2 mg capsule Take 1 capsule (2 mg) by mouth 2 times a day as needed for diarrhea. 120 capsule 3    omega-3 1,000 mg capsule capsule Take 1 capsule (1,000 mg) by mouth once daily.      timolol (Timoptic) 0.5 % ophthalmic solution Administer into affected eye(s).      timolol (Timoptic-XR) 0.5 % ophthalmic gel-forming instill 1 drop into both eyes every morning      vits A,C,E/lutein/minerals (OCUVITE WITH LUTEIN ORAL) Take by mouth.      melatonin 5 mg capsule Take by mouth.       No current facility-administered medications on file prior to visit.        Review of Systems   A comprehensive 10+  review of systems was negative except for: see hpi          Physical Exam                                                                                                                      General: Well developed, well nourished, alert and cooperative, appears in no acute distress  Head: Normocephalic, atraumatic  Neck: supple, trachea midline  Eyes: Non-injected conjunctiva, sclera clear, no proptosis  Cardiac: Extremities are warm and well perfused. No edema, cyanosis or pallor.   Lungs: Breathing is easy, non-labored. Speaking in clear and complete sentences. Normal diaphragmatic movement.  Abdomen: soft, non-distended, non-tender, no rebound or guarding, no hernia and no CVA tenderness   MSK: Ambulatory with steady gait, unassisted  Neuro: alert and oriented to person, place and time  Psych: Demonstrates good judgement and reason, without hallucinations, abnormal affect or abnormal behaviors.  Skin: no obvious lesions, no rashes      PVR (by Ultrasound): 150mL   Urine dip:   Recent Results (from the past 6 hour(s))   POCT UA Automated manually resulted    Collection Time: 02/27/24 10:02 AM   Result Value Ref Range    POC Glucose, Urine NEGATIVE NEGATIVE mg/dl    POC Bilirubin, Urine NEGATIVE NEGATIVE    POC Ketones, Urine NEGATIVE NEGATIVE mg/dl    POC Specific Gravity, Urine 1.010 1.005 - 1.035    POC Blood, Urine NEGATIVE NEGATIVE    POC PH, Urine 6.5 No Reference Range Established PH    POC Protein, Urine NEGATIVE NEGATIVE, 30 (1+) mg/dl    POC Urobilinogen, Urine 0.2 0.2, 1.0 EU/DL    Poc Nitrite, Urine POSITIVE (A) NEGATIVE    POC Leukocytes, Urine SMALL (1+) (A) NEGATIVE       Labs  abnormal data Urine Culture  Order: [id number] - Reflex for Order [id number]  Collected 1/31/2024 09:04       Status: Final result       Visible to patient: Yes (not seen)       Dx: Dysuria    Specimen Information: Clean Catch/Voided; Urine   1 Result Note  Urine Culture >100,000 Escherichia coli Abnormal            Resulting  Agency: [facility]     Susceptibility     Escherichia coli     MICROSCAN    $ Amoxicillin/Clavulanate Susceptible    $$ Ampicillin Resistant    $$$ Ampicillin/Sulbactam Resistant    $ Cefazolin Resistant     Cefazolin (uncomplicated UTIs only) Susceptible     Ceftriaxone Susceptible    $ Ciprofloxacin Susceptible    $ Gentamicin Susceptible    $ Nitrofurantoin Susceptible    $$ Piperacillin/Tazobactam Susceptible    $ Trimethoprim/Sulfamethoxazole Susceptible                  Specimen Collected: 24 09:04 Last Resulted: 24 08:49             Imaging  N/A      IMPRESSION AND PLAN:  Micaela Leos is a 83 y.o.  presents to the office as a new patient for urinary urgency and frequency that has been going on for the last few months. She gets up 3-5 times a night to go to the bathroom. She admits to having urge urinary incontinence, it happens rarely and she is not bothered by it. She was treated for a UTI in January growing E coli by her PCP.    LUTs  -Positive urine culture 2024 E coli  -standing urine culture  -culture sent out today, will call with results  -start vaginal estrogen    OAB  -Start Gemtessa, take before bed  -we discussed botox vs sacral neuromodulation: both have similar efficacy 80% patients reports >50% improvement, botox associated with 5% risk of incomplete emptying, increase in UTI and will require re-injection in 6-9 months; and as early as 3 months. SNM is a staged procedure, 2 weeks apart, consisting first of lead implantation then internalization of IPG if there is improvement. Interstim is associated with lead migration, explantation, infection and bleeding, though risks are all <5%. We also discussed PTNS which is associated with success rates comparable to medical therapy but without side-effects without significant major morbidity.      Follow up in 3 months    All questions and concerns were answered and addressed.  The patient expressed understanding and agrees with the  plan.     Reviewed and approved by JAY OKEEFE on 2/27/24 at 10:13 AM.

## 2024-02-27 ENCOUNTER — OFFICE VISIT (OUTPATIENT)
Dept: UROLOGY | Facility: CLINIC | Age: 84
End: 2024-02-27
Payer: MEDICARE

## 2024-02-27 VITALS — SYSTOLIC BLOOD PRESSURE: 159 MMHG | DIASTOLIC BLOOD PRESSURE: 88 MMHG

## 2024-02-27 DIAGNOSIS — R39.15 URINARY URGENCY: ICD-10-CM

## 2024-02-27 DIAGNOSIS — N39.41 URGE URINARY INCONTINENCE: ICD-10-CM

## 2024-02-27 DIAGNOSIS — R30.0 DYSURIA: ICD-10-CM

## 2024-02-27 DIAGNOSIS — R35.1 NOCTURIA: Primary | ICD-10-CM

## 2024-02-27 DIAGNOSIS — R35.0 FREQUENCY OF URINATION: ICD-10-CM

## 2024-02-27 DIAGNOSIS — N39.0 URINARY TRACT INFECTION WITHOUT HEMATURIA, SITE UNSPECIFIED: ICD-10-CM

## 2024-02-27 DIAGNOSIS — N95.8 GENITOURINARY SYNDROME OF MENOPAUSE: ICD-10-CM

## 2024-02-27 LAB
POC BILIRUBIN, URINE: NEGATIVE
POC BLOOD, URINE: NEGATIVE
POC GLUCOSE, URINE: NEGATIVE MG/DL
POC KETONES, URINE: NEGATIVE MG/DL
POC LEUKOCYTES, URINE: ABNORMAL
POC NITRITE,URINE: POSITIVE
POC PH, URINE: 6.5 PH
POC PROTEIN, URINE: NEGATIVE MG/DL
POC SPECIFIC GRAVITY, URINE: 1.01
POC UROBILINOGEN, URINE: 0.2 EU/DL

## 2024-02-27 PROCEDURE — 1159F MED LIST DOCD IN RCRD: CPT

## 2024-02-27 PROCEDURE — 1157F ADVNC CARE PLAN IN RCRD: CPT

## 2024-02-27 PROCEDURE — 3079F DIAST BP 80-89 MM HG: CPT

## 2024-02-27 PROCEDURE — 3077F SYST BP >= 140 MM HG: CPT

## 2024-02-27 PROCEDURE — 87086 URINE CULTURE/COLONY COUNT: CPT

## 2024-02-27 PROCEDURE — 81003 URINALYSIS AUTO W/O SCOPE: CPT

## 2024-02-27 PROCEDURE — 87186 SC STD MICRODIL/AGAR DIL: CPT

## 2024-02-27 PROCEDURE — 1126F AMNT PAIN NOTED NONE PRSNT: CPT

## 2024-02-27 PROCEDURE — 1160F RVW MEDS BY RX/DR IN RCRD: CPT

## 2024-02-27 PROCEDURE — 51798 US URINE CAPACITY MEASURE: CPT

## 2024-02-27 PROCEDURE — 99214 OFFICE O/P EST MOD 30 MIN: CPT

## 2024-02-27 PROCEDURE — 1123F ACP DISCUSS/DSCN MKR DOCD: CPT

## 2024-02-27 PROCEDURE — 2000F BLOOD PRESSURE MEASURE: CPT

## 2024-02-27 RX ORDER — VIBEGRON 75 MG/1
75 TABLET, FILM COATED ORAL NIGHTLY
Qty: 84 TABLET | Refills: 0 | COMMUNITY
Start: 2024-02-27 | End: 2024-05-21

## 2024-02-27 RX ORDER — ESTRADIOL 0.1 MG/G
CREAM VAGINAL
Qty: 42.5 G | Refills: 12 | Status: SHIPPED | OUTPATIENT
Start: 2024-02-27

## 2024-02-29 ENCOUNTER — TELEPHONE (OUTPATIENT)
Dept: PRIMARY CARE | Facility: CLINIC | Age: 84
End: 2024-02-29
Payer: MEDICARE

## 2024-02-29 DIAGNOSIS — Z00.00 MEDICARE ANNUAL WELLNESS VISIT, SUBSEQUENT: ICD-10-CM

## 2024-02-29 DIAGNOSIS — E16.2 HYPOGLYCEMIA: ICD-10-CM

## 2024-02-29 DIAGNOSIS — I10 BENIGN ESSENTIAL HYPERTENSION: ICD-10-CM

## 2024-02-29 DIAGNOSIS — Z13.220 LIPID SCREENING: Primary | ICD-10-CM

## 2024-03-01 DIAGNOSIS — N39.0 RECURRENT UTI: ICD-10-CM

## 2024-03-01 DIAGNOSIS — N39.0 URINARY TRACT INFECTION WITHOUT HEMATURIA, SITE UNSPECIFIED: Primary | ICD-10-CM

## 2024-03-01 LAB — BACTERIA UR CULT: ABNORMAL

## 2024-03-01 RX ORDER — NITROFURANTOIN 25; 75 MG/1; MG/1
100 CAPSULE ORAL 2 TIMES DAILY
Qty: 10 CAPSULE | Refills: 0 | Status: SHIPPED | OUTPATIENT
Start: 2024-03-01 | End: 2024-03-06

## 2024-03-01 RX ORDER — NITROFURANTOIN 25; 75 MG/1; MG/1
CAPSULE ORAL
Qty: 90 CAPSULE | Refills: 0 | Status: SHIPPED | OUTPATIENT
Start: 2024-03-01

## 2024-03-07 ENCOUNTER — LAB (OUTPATIENT)
Dept: LAB | Facility: LAB | Age: 84
End: 2024-03-07
Payer: MEDICARE

## 2024-03-07 DIAGNOSIS — Z13.220 LIPID SCREENING: ICD-10-CM

## 2024-03-07 DIAGNOSIS — I10 BENIGN ESSENTIAL HYPERTENSION: ICD-10-CM

## 2024-03-07 DIAGNOSIS — Z00.00 MEDICARE ANNUAL WELLNESS VISIT, SUBSEQUENT: ICD-10-CM

## 2024-03-07 DIAGNOSIS — E16.2 HYPOGLYCEMIA: ICD-10-CM

## 2024-03-07 LAB
ALBUMIN SERPL BCP-MCNC: 4 G/DL (ref 3.4–5)
ALP SERPL-CCNC: 74 U/L (ref 33–136)
ALT SERPL W P-5'-P-CCNC: 14 U/L (ref 7–45)
ANION GAP SERPL CALC-SCNC: 12 MMOL/L (ref 10–20)
AST SERPL W P-5'-P-CCNC: 17 U/L (ref 9–39)
BASOPHILS # BLD AUTO: 0.07 X10*3/UL (ref 0–0.1)
BASOPHILS NFR BLD AUTO: 0.7 %
BILIRUB SERPL-MCNC: 0.5 MG/DL (ref 0–1.2)
BUN SERPL-MCNC: 13 MG/DL (ref 6–23)
CALCIUM SERPL-MCNC: 9 MG/DL (ref 8.6–10.3)
CHLORIDE SERPL-SCNC: 103 MMOL/L (ref 98–107)
CHOLEST SERPL-MCNC: 101 MG/DL (ref 0–199)
CHOLESTEROL/HDL RATIO: 2.2
CO2 SERPL-SCNC: 27 MMOL/L (ref 21–32)
CREAT SERPL-MCNC: 0.67 MG/DL (ref 0.5–1.05)
EGFRCR SERPLBLD CKD-EPI 2021: 87 ML/MIN/1.73M*2
EOSINOPHIL # BLD AUTO: 0.86 X10*3/UL (ref 0–0.4)
EOSINOPHIL NFR BLD AUTO: 8.6 %
ERYTHROCYTE [DISTWIDTH] IN BLOOD BY AUTOMATED COUNT: 12.4 % (ref 11.5–14.5)
EST. AVERAGE GLUCOSE BLD GHB EST-MCNC: 114 MG/DL
GLUCOSE SERPL-MCNC: 83 MG/DL (ref 74–99)
HBA1C MFR BLD: 5.6 %
HCT VFR BLD AUTO: 39.9 % (ref 36–46)
HDLC SERPL-MCNC: 45.4 MG/DL
HGB BLD-MCNC: 12.6 G/DL (ref 12–16)
IMM GRANULOCYTES # BLD AUTO: 0.03 X10*3/UL (ref 0–0.5)
IMM GRANULOCYTES NFR BLD AUTO: 0.3 % (ref 0–0.9)
LDLC SERPL CALC-MCNC: 43 MG/DL
LYMPHOCYTES # BLD AUTO: 1.47 X10*3/UL (ref 0.8–3)
LYMPHOCYTES NFR BLD AUTO: 14.7 %
MCH RBC QN AUTO: 31.9 PG (ref 26–34)
MCHC RBC AUTO-ENTMCNC: 31.6 G/DL (ref 32–36)
MCV RBC AUTO: 101 FL (ref 80–100)
MONOCYTES # BLD AUTO: 0.76 X10*3/UL (ref 0.05–0.8)
MONOCYTES NFR BLD AUTO: 7.6 %
NEUTROPHILS # BLD AUTO: 6.8 X10*3/UL (ref 1.6–5.5)
NEUTROPHILS NFR BLD AUTO: 68.1 %
NON HDL CHOLESTEROL: 56 MG/DL (ref 0–149)
NRBC BLD-RTO: 0 /100 WBCS (ref 0–0)
PLATELET # BLD AUTO: 254 X10*3/UL (ref 150–450)
POTASSIUM SERPL-SCNC: 4.1 MMOL/L (ref 3.5–5.3)
PROT SERPL-MCNC: 6.9 G/DL (ref 6.4–8.2)
RBC # BLD AUTO: 3.95 X10*6/UL (ref 4–5.2)
SODIUM SERPL-SCNC: 138 MMOL/L (ref 136–145)
TRIGL SERPL-MCNC: 63 MG/DL (ref 0–149)
TSH SERPL-ACNC: 3.14 MIU/L (ref 0.44–3.98)
VLDL: 13 MG/DL (ref 0–40)
WBC # BLD AUTO: 10 X10*3/UL (ref 4.4–11.3)

## 2024-03-07 PROCEDURE — 84443 ASSAY THYROID STIM HORMONE: CPT

## 2024-03-07 PROCEDURE — 83036 HEMOGLOBIN GLYCOSYLATED A1C: CPT

## 2024-03-07 PROCEDURE — 36415 COLL VENOUS BLD VENIPUNCTURE: CPT

## 2024-03-07 PROCEDURE — 80053 COMPREHEN METABOLIC PANEL: CPT

## 2024-03-07 PROCEDURE — 85025 COMPLETE CBC W/AUTO DIFF WBC: CPT

## 2024-03-07 PROCEDURE — 80061 LIPID PANEL: CPT

## 2024-03-09 ENCOUNTER — HOSPITAL ENCOUNTER (EMERGENCY)
Facility: HOSPITAL | Age: 84
Discharge: HOME | End: 2024-03-09
Attending: EMERGENCY MEDICINE
Payer: MEDICARE

## 2024-03-09 ENCOUNTER — APPOINTMENT (OUTPATIENT)
Dept: RADIOLOGY | Facility: HOSPITAL | Age: 84
End: 2024-03-09
Payer: MEDICARE

## 2024-03-09 ENCOUNTER — APPOINTMENT (OUTPATIENT)
Dept: CARDIOLOGY | Facility: HOSPITAL | Age: 84
End: 2024-03-09
Payer: MEDICARE

## 2024-03-09 VITALS
OXYGEN SATURATION: 92 % | SYSTOLIC BLOOD PRESSURE: 129 MMHG | DIASTOLIC BLOOD PRESSURE: 78 MMHG | RESPIRATION RATE: 18 BRPM | TEMPERATURE: 98.7 F | HEIGHT: 63 IN | WEIGHT: 127 LBS | HEART RATE: 85 BPM | BODY MASS INDEX: 22.5 KG/M2

## 2024-03-09 DIAGNOSIS — J06.9 VIRAL URI WITH COUGH: Primary | ICD-10-CM

## 2024-03-09 DIAGNOSIS — R53.1 GENERALIZED WEAKNESS: ICD-10-CM

## 2024-03-09 LAB
ALBUMIN SERPL BCP-MCNC: 3.7 G/DL (ref 3.4–5)
ALP SERPL-CCNC: 72 U/L (ref 33–136)
ALT SERPL W P-5'-P-CCNC: 14 U/L (ref 7–45)
ANION GAP SERPL CALC-SCNC: 11 MMOL/L (ref 10–20)
APPEARANCE UR: CLEAR
AST SERPL W P-5'-P-CCNC: 16 U/L (ref 9–39)
BASOPHILS # BLD AUTO: 0.07 X10*3/UL (ref 0–0.1)
BASOPHILS NFR BLD AUTO: 0.3 %
BILIRUB SERPL-MCNC: 0.8 MG/DL (ref 0–1.2)
BILIRUB UR STRIP.AUTO-MCNC: NEGATIVE MG/DL
BNP SERPL-MCNC: 209 PG/ML (ref 0–99)
BUN SERPL-MCNC: 9 MG/DL (ref 6–23)
CALCIUM SERPL-MCNC: 8.9 MG/DL (ref 8.6–10.3)
CARDIAC TROPONIN I PNL SERPL HS: 14 NG/L (ref 0–13)
CHLORIDE SERPL-SCNC: 97 MMOL/L (ref 98–107)
CO2 SERPL-SCNC: 26 MMOL/L (ref 21–32)
COLOR UR: YELLOW
CREAT SERPL-MCNC: 0.58 MG/DL (ref 0.5–1.05)
D DIMER PPP FEU-MCNC: 738 NG/ML FEU
EGFRCR SERPLBLD CKD-EPI 2021: 90 ML/MIN/1.73M*2
EOSINOPHIL # BLD AUTO: 0.24 X10*3/UL (ref 0–0.4)
EOSINOPHIL NFR BLD AUTO: 1 %
ERYTHROCYTE [DISTWIDTH] IN BLOOD BY AUTOMATED COUNT: 12.1 % (ref 11.5–14.5)
FLUAV RNA RESP QL NAA+PROBE: NOT DETECTED
FLUBV RNA RESP QL NAA+PROBE: NOT DETECTED
GLUCOSE SERPL-MCNC: 151 MG/DL (ref 74–99)
GLUCOSE UR STRIP.AUTO-MCNC: NEGATIVE MG/DL
HCT VFR BLD AUTO: 36 % (ref 36–46)
HGB BLD-MCNC: 12.3 G/DL (ref 12–16)
HOLD SPECIMEN: NORMAL
IMM GRANULOCYTES # BLD AUTO: 0.15 X10*3/UL (ref 0–0.5)
IMM GRANULOCYTES NFR BLD AUTO: 0.6 % (ref 0–0.9)
KETONES UR STRIP.AUTO-MCNC: ABNORMAL MG/DL
LACTATE SERPL-SCNC: 1.1 MMOL/L (ref 0.4–2)
LEUKOCYTE ESTERASE UR QL STRIP.AUTO: NEGATIVE
LYMPHOCYTES # BLD AUTO: 0.91 X10*3/UL (ref 0.8–3)
LYMPHOCYTES NFR BLD AUTO: 3.9 %
MCH RBC QN AUTO: 32.8 PG (ref 26–34)
MCHC RBC AUTO-ENTMCNC: 34.2 G/DL (ref 32–36)
MCV RBC AUTO: 96 FL (ref 80–100)
MONOCYTES # BLD AUTO: 1.35 X10*3/UL (ref 0.05–0.8)
MONOCYTES NFR BLD AUTO: 5.8 %
NEUTROPHILS # BLD AUTO: 20.46 X10*3/UL (ref 1.6–5.5)
NEUTROPHILS NFR BLD AUTO: 88.4 %
NITRITE UR QL STRIP.AUTO: NEGATIVE
NRBC BLD-RTO: 0 /100 WBCS (ref 0–0)
PH UR STRIP.AUTO: 6 [PH]
PLATELET # BLD AUTO: 207 X10*3/UL (ref 150–450)
POTASSIUM SERPL-SCNC: 3.9 MMOL/L (ref 3.5–5.3)
PROT SERPL-MCNC: 6.9 G/DL (ref 6.4–8.2)
PROT UR STRIP.AUTO-MCNC: NEGATIVE MG/DL
RBC # BLD AUTO: 3.75 X10*6/UL (ref 4–5.2)
RBC # UR STRIP.AUTO: NEGATIVE /UL
SARS-COV-2 RNA RESP QL NAA+PROBE: NOT DETECTED
SODIUM SERPL-SCNC: 130 MMOL/L (ref 136–145)
SP GR UR STRIP.AUTO: 1.01
UROBILINOGEN UR STRIP.AUTO-MCNC: <2 MG/DL
WBC # BLD AUTO: 23.2 X10*3/UL (ref 4.4–11.3)

## 2024-03-09 PROCEDURE — 2500000002 HC RX 250 W HCPCS SELF ADMINISTERED DRUGS (ALT 637 FOR MEDICARE OP, ALT 636 FOR OP/ED): Performed by: NURSE PRACTITIONER

## 2024-03-09 PROCEDURE — 74176 CT ABD & PELVIS W/O CONTRAST: CPT

## 2024-03-09 PROCEDURE — 80053 COMPREHEN METABOLIC PANEL: CPT | Performed by: NURSE PRACTITIONER

## 2024-03-09 PROCEDURE — 81003 URINALYSIS AUTO W/O SCOPE: CPT | Performed by: NURSE PRACTITIONER

## 2024-03-09 PROCEDURE — 94640 AIRWAY INHALATION TREATMENT: CPT

## 2024-03-09 PROCEDURE — 71045 X-RAY EXAM CHEST 1 VIEW: CPT

## 2024-03-09 PROCEDURE — 36415 COLL VENOUS BLD VENIPUNCTURE: CPT | Performed by: NURSE PRACTITIONER

## 2024-03-09 PROCEDURE — 87636 SARSCOV2 & INF A&B AMP PRB: CPT | Performed by: NURSE PRACTITIONER

## 2024-03-09 PROCEDURE — 83880 ASSAY OF NATRIURETIC PEPTIDE: CPT | Performed by: NURSE PRACTITIONER

## 2024-03-09 PROCEDURE — 93005 ELECTROCARDIOGRAM TRACING: CPT

## 2024-03-09 PROCEDURE — 99285 EMERGENCY DEPT VISIT HI MDM: CPT | Mod: 25

## 2024-03-09 PROCEDURE — 71045 X-RAY EXAM CHEST 1 VIEW: CPT | Performed by: STUDENT IN AN ORGANIZED HEALTH CARE EDUCATION/TRAINING PROGRAM

## 2024-03-09 PROCEDURE — 84484 ASSAY OF TROPONIN QUANT: CPT | Performed by: NURSE PRACTITIONER

## 2024-03-09 PROCEDURE — 83605 ASSAY OF LACTIC ACID: CPT | Performed by: NURSE PRACTITIONER

## 2024-03-09 PROCEDURE — 74176 CT ABD & PELVIS W/O CONTRAST: CPT | Performed by: STUDENT IN AN ORGANIZED HEALTH CARE EDUCATION/TRAINING PROGRAM

## 2024-03-09 PROCEDURE — 85379 FIBRIN DEGRADATION QUANT: CPT | Performed by: NURSE PRACTITIONER

## 2024-03-09 PROCEDURE — 85025 COMPLETE CBC W/AUTO DIFF WBC: CPT | Performed by: NURSE PRACTITIONER

## 2024-03-09 RX ORDER — IPRATROPIUM BROMIDE AND ALBUTEROL SULFATE 2.5; .5 MG/3ML; MG/3ML
3 SOLUTION RESPIRATORY (INHALATION)
Status: DISCONTINUED | OUTPATIENT
Start: 2024-03-09 | End: 2024-03-09 | Stop reason: HOSPADM

## 2024-03-09 RX ADMIN — IPRATROPIUM BROMIDE AND ALBUTEROL SULFATE 3 ML: .5; 3 SOLUTION RESPIRATORY (INHALATION) at 09:47

## 2024-03-09 ASSESSMENT — PAIN SCALES - GENERAL: PAINLEVEL_OUTOF10: 0 - NO PAIN

## 2024-03-09 ASSESSMENT — COLUMBIA-SUICIDE SEVERITY RATING SCALE - C-SSRS
6. HAVE YOU EVER DONE ANYTHING, STARTED TO DO ANYTHING, OR PREPARED TO DO ANYTHING TO END YOUR LIFE?: NO
2. HAVE YOU ACTUALLY HAD ANY THOUGHTS OF KILLING YOURSELF?: NO
1. IN THE PAST MONTH, HAVE YOU WISHED YOU WERE DEAD OR WISHED YOU COULD GO TO SLEEP AND NOT WAKE UP?: NO

## 2024-03-09 ASSESSMENT — LIFESTYLE VARIABLES
EVER FELT BAD OR GUILTY ABOUT YOUR DRINKING: NO
HAVE YOU EVER FELT YOU SHOULD CUT DOWN ON YOUR DRINKING: NO
EVER HAD A DRINK FIRST THING IN THE MORNING TO STEADY YOUR NERVES TO GET RID OF A HANGOVER: NO
HAVE PEOPLE ANNOYED YOU BY CRITICIZING YOUR DRINKING: NO

## 2024-03-09 ASSESSMENT — PAIN - FUNCTIONAL ASSESSMENT: PAIN_FUNCTIONAL_ASSESSMENT: 0-10

## 2024-03-09 NOTE — ED PROVIDER NOTES
HPI   Chief Complaint   Patient presents with    Cough     Started 2/24/24, persistent since.     Wheezing     New onset, low pulse ox, no hx of respiratory issues    Dizziness     Feels lightheaded, started 1/27/24. Has been tested since, no findings. Symptom persists.        Leonie is a 83-year-old female presenting here to the emergency department today reporting intermittent body aches, dyspnea, abdominal bloating.  She reports she was recently treated for UTI twice was concerned that maybe she also has a UTI again as she has had frequency of urination.  The frequency of urination has been present for months.  She does smoke cigarettes states she is never been diagnosed with COPD.  She reports she has had some intermittent dizziness as well.  She reports no headache or vision changes.  She reports no sinus congestion, sore throat, ear pain.  She reports no back or flank pain.  She reports no chest pain.  She reports no abdominal pain currently.  She reports no swelling to her extremities.  She is accompanied by her daughter.  She has not had a fever at home.  She does not wear oxygen at home.  Her oxygen saturation was noted to be 92% on arrival here to the emergency department and documented as low as 88% though that result was reported as possibly inaccurate and 92% was what was subsequently read.  She has no other associated complaints.                          [place] Coma Scale Score: 15                     Patient History   No past medical history on file.  Past Surgical History:   Procedure Laterality Date    APPENDECTOMY  02/20/2014    Appendectomy    CATARACT EXTRACTION  08/08/2017    Cataract Surgery    LUMBAR FUSION  02/20/2014    Lumbar Vertebral Fusion    TONSILLECTOMY  02/20/2014    Tonsillectomy     Family History   Problem Relation Name Age of Onset    No Known Problems Mother      Other (cardiac disorder) Father      Coronary artery disease Father      Other (CABG) Father       Social History      Tobacco Use    Smoking status: Every Day     Packs/day: .5     Types: Cigarettes    Smokeless tobacco: Never   Vaping Use    Vaping Use: Never used   Substance Use Topics    Alcohol use: Yes     Alcohol/week: 4.0 standard drinks of alcohol     Types: 4 Standard drinks or equivalent per week     Comment: Social    Drug use: Never       Physical Exam   ED Triage Vitals [03/09/24 0915]   Temperature Heart Rate Respirations BP   37.1 °C (98.7 °F) 95 18 131/76      Pulse Ox Temp Source Heart Rate Source Patient Position   (!) 88 % Temporal Monitor Sitting      BP Location FiO2 (%)     Left arm --       Physical Exam  Vitals and nursing note reviewed.   Constitutional:       Comments: General: Conversant and pleasant interactive and nontoxic.     Head: Normocephalic/atraumatic     Eyes: PERRL, EOMI, no conjunctivitis     Nares: Without d/c.    Ears: No erythema or d/c noted.    Oralpharnyx: No Exudate, tonsillar edema, or pharyngeal erythema, mucous membranes moist    Neck: Supple, no JVD, or lymphadenopathy    Cardovascular: RRR without murmur, brisk capillary refill, no peripheral edema.    Lungs: CTA B/L, non-labored    Abd: Soft nontender, no guarding, no rebound.    : Defered    Extremities: No edema or erythema    Neuro: AOx3, no obvious gross neuro deficit    Psych: Normal Affect     Derm:  No rashes         ED Course & MDM   Diagnoses as of 03/09/24 1235   Viral URI with cough   Generalized weakness       Medical Decision Making  1209: External documents/labs were reviewed, CT interpretation reviewed, x-ray interpretation reviewed, discussed with consultants/staff, shared decision making utilized by explaining the results and plan of care for disposition and next steps of care with the patient and/or family, social determinants of health considered, discussed options for treatment with or without prescription medications, and potential impacted patient's medical/surgical comorbidities were assessed and  considered when determining the treatment course and outcome.  Patient was also seen and examined by ED attending.  Abdomen reassessed soft nontender.  Specifically she has no tenderness to her right upper quadrant.  Chest x-ray unremarkable.  Patient's general malaise has been most present for the past week though daughter reports symptoms have been in a intermittent fashion for months.  I plan to ambulate patient with a pulse oximeter on.    1235: Ambulatory pulse ox to 91% but quickly rebounded to 94 for 95% at rest.  Patient's lungs clear.  Patient to follow-up with her primary care physician in 2 days.  Patient return back to the emergency department for any new or worsening symptoms        Procedure  ECG 12 lead    Performed by: NIMESH Reardon  Authorized by: NIMESH Reardon    ECG interpreted by ED Physician in the absence of a cardiologist: yes    Previous ECG:     Previous ECG:  Unavailable  Interpretation:     Interpretation: normal    Rate:     ECG rate assessment: normal    Rhythm:     Rhythm: sinus rhythm    Ectopy:     Ectopy: none    QRS:     QRS axis:  Normal    QRS intervals:  Normal    QRS conduction: normal    ST segments:     ST segments:  Normal  T waves:     T waves: normal    Q waves:     Abnormal Q-waves: present    Comments:      Performed at 1006 reviewed at 1009 by ED attending RADHA vargas STEMI.       NIMESH Reardon  03/09/24 1237

## 2024-03-11 ENCOUNTER — TELEPHONE (OUTPATIENT)
Dept: PRIMARY CARE | Facility: CLINIC | Age: 84
End: 2024-03-11
Payer: MEDICARE

## 2024-03-11 DIAGNOSIS — R06.02 SHORTNESS OF BREATH: Primary | ICD-10-CM

## 2024-03-11 NOTE — TELEPHONE ENCOUNTER
Micaela was in the ER for Dyspnea and just feeling very fatigued.  Her labs were just a little off normal.   Please call her with an appointment

## 2024-03-13 ENCOUNTER — TELEPHONE (OUTPATIENT)
Dept: PRIMARY CARE | Facility: CLINIC | Age: 84
End: 2024-03-13
Payer: MEDICARE

## 2024-03-13 DIAGNOSIS — E87.1 HYPONATREMIA: ICD-10-CM

## 2024-03-13 DIAGNOSIS — R06.02 SHORTNESS OF BREATH: ICD-10-CM

## 2024-03-13 DIAGNOSIS — R05.1 ACUTE COUGH: Primary | ICD-10-CM

## 2024-03-13 DIAGNOSIS — R79.89 ELEVATED BRAIN NATRIURETIC PEPTIDE (BNP) LEVEL: ICD-10-CM

## 2024-03-13 NOTE — TELEPHONE ENCOUNTER
Patients daughter (Love) called in with concerns about her mom. She is getting no sleep at night and wants to know if something can be called in to help. She is up with a cough all night and has tried mucinex DM and tessalon pearls with no relief. She was in the ER on 3/9 and had abnormal lab results and daughter wants to know if labs should be ordered to be done again before her appointment on 3/20.

## 2024-03-14 PROBLEM — H61.20 IMPACTED CERUMEN: Status: ACTIVE | Noted: 2024-03-14

## 2024-03-14 PROBLEM — R09.89 CAROTID BRUIT: Status: ACTIVE | Noted: 2024-03-14

## 2024-03-14 PROBLEM — M62.81 MUSCLE WEAKNESS OF EXTREMITY: Status: ACTIVE | Noted: 2023-09-25

## 2024-03-14 PROBLEM — R20.0 NUMBNESS OF FACE: Status: ACTIVE | Noted: 2024-03-14

## 2024-03-14 RX ORDER — PREDNISONE 20 MG/1
TABLET ORAL
Qty: 18 TABLET | Refills: 0 | Status: SHIPPED | OUTPATIENT
Start: 2024-03-14

## 2024-03-14 RX ORDER — PROMETHAZINE HYDROCHLORIDE AND DEXTROMETHORPHAN HYDROBROMIDE 6.25; 15 MG/5ML; MG/5ML
5 SYRUP ORAL EVERY 4 HOURS PRN
Qty: 120 ML | Refills: 0 | Status: SHIPPED | OUTPATIENT
Start: 2024-03-14 | End: 2024-04-13

## 2024-03-14 NOTE — TELEPHONE ENCOUNTER
Jeni Payton, APRN-CNP  P Do [id number] [facility]  Caller: Unspecified (Yesterday,  3:45 PM)  Yes I have sent in steroid and cough syrup for the cough and ordered lab work for patient to get done as soon as possible to recheck those labs

## 2024-03-15 ENCOUNTER — TELEPHONE (OUTPATIENT)
Dept: PRIMARY CARE | Facility: CLINIC | Age: 84
End: 2024-03-15

## 2024-03-15 ENCOUNTER — LAB (OUTPATIENT)
Dept: LAB | Facility: LAB | Age: 84
End: 2024-03-15
Payer: MEDICARE

## 2024-03-15 DIAGNOSIS — R79.89 ELEVATED BRAIN NATRIURETIC PEPTIDE (BNP) LEVEL: ICD-10-CM

## 2024-03-15 DIAGNOSIS — R05.1 ACUTE COUGH: ICD-10-CM

## 2024-03-15 DIAGNOSIS — E87.1 HYPONATREMIA: ICD-10-CM

## 2024-03-15 DIAGNOSIS — R06.02 SHORTNESS OF BREATH: ICD-10-CM

## 2024-03-15 LAB
ANION GAP SERPL CALC-SCNC: 11 MMOL/L (ref 10–20)
BASOPHILS # BLD AUTO: 0.13 X10*3/UL (ref 0–0.1)
BASOPHILS NFR BLD AUTO: 0.7 %
BNP SERPL-MCNC: 164 PG/ML (ref 0–99)
BUN SERPL-MCNC: 11 MG/DL (ref 6–23)
CALCIUM SERPL-MCNC: 8.9 MG/DL (ref 8.6–10.3)
CHLORIDE SERPL-SCNC: 96 MMOL/L (ref 98–107)
CO2 SERPL-SCNC: 31 MMOL/L (ref 21–32)
CREAT SERPL-MCNC: 0.7 MG/DL (ref 0.5–1.05)
EGFRCR SERPLBLD CKD-EPI 2021: 86 ML/MIN/1.73M*2
EOSINOPHIL # BLD AUTO: 1.9 X10*3/UL (ref 0–0.4)
EOSINOPHIL NFR BLD AUTO: 10.6 %
ERYTHROCYTE [DISTWIDTH] IN BLOOD BY AUTOMATED COUNT: 12.5 % (ref 11.5–14.5)
GLUCOSE SERPL-MCNC: 102 MG/DL (ref 74–99)
HCT VFR BLD AUTO: 38.3 % (ref 36–46)
HGB BLD-MCNC: 12.5 G/DL (ref 12–16)
IMM GRANULOCYTES # BLD AUTO: 0.45 X10*3/UL (ref 0–0.5)
IMM GRANULOCYTES NFR BLD AUTO: 2.5 % (ref 0–0.9)
LYMPHOCYTES # BLD AUTO: 2.51 X10*3/UL (ref 0.8–3)
LYMPHOCYTES NFR BLD AUTO: 14 %
MCH RBC QN AUTO: 32.1 PG (ref 26–34)
MCHC RBC AUTO-ENTMCNC: 32.6 G/DL (ref 32–36)
MCV RBC AUTO: 98 FL (ref 80–100)
MONOCYTES # BLD AUTO: 1.33 X10*3/UL (ref 0.05–0.8)
MONOCYTES NFR BLD AUTO: 7.4 %
NEUTROPHILS # BLD AUTO: 11.61 X10*3/UL (ref 1.6–5.5)
NEUTROPHILS NFR BLD AUTO: 64.8 %
NRBC BLD-RTO: 0 /100 WBCS (ref 0–0)
PLATELET # BLD AUTO: 368 X10*3/UL (ref 150–450)
POTASSIUM SERPL-SCNC: 4.4 MMOL/L (ref 3.5–5.3)
RBC # BLD AUTO: 3.9 X10*6/UL (ref 4–5.2)
SODIUM SERPL-SCNC: 134 MMOL/L (ref 136–145)
WBC # BLD AUTO: 17.9 X10*3/UL (ref 4.4–11.3)

## 2024-03-15 PROCEDURE — 80048 BASIC METABOLIC PNL TOTAL CA: CPT

## 2024-03-15 PROCEDURE — 85025 COMPLETE CBC W/AUTO DIFF WBC: CPT

## 2024-03-15 PROCEDURE — 83880 ASSAY OF NATRIURETIC PEPTIDE: CPT

## 2024-03-15 PROCEDURE — 36415 COLL VENOUS BLD VENIPUNCTURE: CPT

## 2024-03-15 NOTE — TELEPHONE ENCOUNTER
----- Message from Jeni Payton APRN-CNP sent at 3/15/2024 12:57 PM EDT -----  All of blood work numbers are improving- blood counts are still high but trending down, this is likely from resolving infection. If symptoms are not improving I want to see her for follow up. If they start to get worse please call the office.

## 2024-03-15 NOTE — RESULT ENCOUNTER NOTE
All of blood work numbers are improving- blood counts are still high but trending down, this is likely from resolving infection. If symptoms are not improving I want to see her for follow up. If they start to get worse please call the office.

## 2024-03-16 LAB
ATRIAL RATE: 87 BPM
P AXIS: 49 DEGREES
PR INTERVAL: 160 MS
Q ONSET: 251 MS
QRS COUNT: 14 BEATS
QRS DURATION: 98 MS
QT INTERVAL: 348 MS
QTC CALCULATION(BAZETT): 419 MS
QTC FREDERICIA: 393 MS
R AXIS: 5 DEGREES
T AXIS: -3 DEGREES
T OFFSET: 425 MS
VENTRICULAR RATE: 87 BPM

## 2024-03-20 ENCOUNTER — OFFICE VISIT (OUTPATIENT)
Dept: PRIMARY CARE | Facility: CLINIC | Age: 84
End: 2024-03-20
Payer: MEDICARE

## 2024-03-20 VITALS
HEART RATE: 69 BPM | DIASTOLIC BLOOD PRESSURE: 75 MMHG | OXYGEN SATURATION: 95 % | WEIGHT: 121.4 LBS | BODY MASS INDEX: 21.51 KG/M2 | HEIGHT: 63 IN | SYSTOLIC BLOOD PRESSURE: 110 MMHG

## 2024-03-20 DIAGNOSIS — I10 PRIMARY HYPERTENSION: ICD-10-CM

## 2024-03-20 DIAGNOSIS — E78.5 HYPERLIPIDEMIA, UNSPECIFIED HYPERLIPIDEMIA TYPE: ICD-10-CM

## 2024-03-20 DIAGNOSIS — Z00.00 HEALTHCARE MAINTENANCE: ICD-10-CM

## 2024-03-20 DIAGNOSIS — M81.0 OSTEOPOROSIS WITHOUT CURRENT PATHOLOGICAL FRACTURE, UNSPECIFIED OSTEOPOROSIS TYPE: ICD-10-CM

## 2024-03-20 PROCEDURE — 1170F FXNL STATUS ASSESSED: CPT | Performed by: FAMILY MEDICINE

## 2024-03-20 PROCEDURE — 3078F DIAST BP <80 MM HG: CPT | Performed by: FAMILY MEDICINE

## 2024-03-20 PROCEDURE — 99214 OFFICE O/P EST MOD 30 MIN: CPT | Performed by: FAMILY MEDICINE

## 2024-03-20 PROCEDURE — G0439 PPPS, SUBSEQ VISIT: HCPCS | Performed by: FAMILY MEDICINE

## 2024-03-20 PROCEDURE — 3074F SYST BP LT 130 MM HG: CPT | Performed by: FAMILY MEDICINE

## 2024-03-20 PROCEDURE — 1159F MED LIST DOCD IN RCRD: CPT | Performed by: FAMILY MEDICINE

## 2024-03-20 PROCEDURE — 1124F ACP DISCUSS-NO DSCNMKR DOCD: CPT | Performed by: FAMILY MEDICINE

## 2024-03-20 PROCEDURE — 1160F RVW MEDS BY RX/DR IN RCRD: CPT | Performed by: FAMILY MEDICINE

## 2024-03-20 PROCEDURE — 1157F ADVNC CARE PLAN IN RCRD: CPT | Performed by: FAMILY MEDICINE

## 2024-03-20 RX ORDER — AMLODIPINE BESYLATE 5 MG/1
5 TABLET ORAL DAILY
Qty: 90 TABLET | Refills: 3 | Status: SHIPPED | OUTPATIENT
Start: 2024-03-20 | End: 2025-03-20

## 2024-03-20 RX ORDER — ATORVASTATIN CALCIUM 40 MG/1
40 TABLET, FILM COATED ORAL DAILY
Qty: 90 TABLET | Refills: 3 | Status: SHIPPED | OUTPATIENT
Start: 2024-03-20 | End: 2024-04-01 | Stop reason: DRUGHIGH

## 2024-03-20 RX ORDER — ALENDRONATE SODIUM 70 MG/1
70 TABLET ORAL
Qty: 12 TABLET | Refills: 3 | Status: SHIPPED | OUTPATIENT
Start: 2024-03-20 | End: 2025-03-20

## 2024-03-20 ASSESSMENT — PATIENT HEALTH QUESTIONNAIRE - PHQ9
SUM OF ALL RESPONSES TO PHQ9 QUESTIONS 1 AND 2: 0
1. LITTLE INTEREST OR PLEASURE IN DOING THINGS: NOT AT ALL
2. FEELING DOWN, DEPRESSED OR HOPELESS: NOT AT ALL

## 2024-03-20 ASSESSMENT — ENCOUNTER SYMPTOMS
DIZZINESS: 0
LOSS OF SENSATION IN FEET: 0
DEPRESSION: 0
NAUSEA: 0
DYSPHORIC MOOD: 0
CHILLS: 0
MYALGIAS: 0
OCCASIONAL FEELINGS OF UNSTEADINESS: 0
WEAKNESS: 0
BACK PAIN: 0
SHORTNESS OF BREATH: 0
NERVOUS/ANXIOUS: 0
FATIGUE: 0
COUGH: 1
HEADACHES: 0
PALPITATIONS: 0
ARTHRALGIAS: 0
FEVER: 0
VOMITING: 0

## 2024-03-20 ASSESSMENT — ACTIVITIES OF DAILY LIVING (ADL)
DRESSING: INDEPENDENT
GROCERY_SHOPPING: INDEPENDENT
DOING_HOUSEWORK: INDEPENDENT
BATHING: INDEPENDENT
TAKING_MEDICATION: INDEPENDENT
MANAGING_FINANCES: INDEPENDENT

## 2024-03-20 NOTE — PROGRESS NOTES
"Subjective   Reason for Visit: Micaela Leos is an 83 y.o. female here for a Medicare Wellness visit.          Reviewed all medications by prescribing practitioner or clinical pharmacist (such as prescriptions, OTCs, herbal therapies and supplements) and documented in the medical record.    Presents with daughter for follow up of recent ER visit for cough. Was seen in ER on 3/9 for multiple symptoms, was diagnosed with viral URI and discharged home. Work up was +leukocytosis, hyponatremia and elevated BNP however chest xray and CT abdomen where negative for acute findings. On 3/13 was started on prednisone and cough syrup for ongoing cough and lab work ordered to get done prior to follow up. Today reports she is feeling much better and cough is improving.     HTN-controlled on amlodipine and refills are needed    HLD-is on atorvastatin and cholesterol is controlled.     Has an echocardiogram scheduled with follow up with cardiology scheduled for next week.         Patient Care Team:  NIMESH Parker as PCP - General (Family Medicine)  NIMESH Parker as PCP - [employer] PCP     Review of Systems   Constitutional:  Negative for chills, fatigue and fever.   Eyes:  Negative for visual disturbance.   Respiratory:  Positive for cough. Negative for shortness of breath.    Cardiovascular:  Negative for chest pain and palpitations.   Gastrointestinal:  Negative for nausea and vomiting.   Musculoskeletal:  Negative for arthralgias, back pain and myalgias.   Skin:  Negative for rash.   Neurological:  Negative for dizziness, syncope, weakness and headaches.   Psychiatric/Behavioral:  Negative for dysphoric mood. The patient is not nervous/anxious.        Objective   Vitals:  /75   Pulse 69   Ht 1.6 m (5' 3\")   Wt 55.1 kg (121 lb 6.4 oz)   SpO2 95%   BMI 21.51 kg/m²       Physical Exam  Constitutional:       Appearance: Normal appearance.   HENT:      Head: " Normocephalic and atraumatic.   Cardiovascular:      Rate and Rhythm: Normal rate and regular rhythm.      Heart sounds: No murmur heard.     No gallop.   Pulmonary:      Effort: Pulmonary effort is normal. No respiratory distress.      Breath sounds: Normal breath sounds.   Musculoskeletal:         General: Normal range of motion.   Skin:     General: Skin is warm and dry.      Findings: No lesion or rash.   Neurological:      General: No focal deficit present.      Mental Status: She is alert and oriented to person, place, and time. Mental status is at baseline.   Psychiatric:         Mood and Affect: Mood normal.         Behavior: Behavior normal.         Assessment/Plan   Problem List Items Addressed This Visit       Hyperlipidemia    Relevant Medications    atorvastatin (Lipitor) 40 mg tablet    Other Relevant Orders    Follow Up In [facility] - PCP - Established    Osteoporosis    Relevant Medications    alendronate (Fosamax) 70 mg tablet     Other Visit Diagnoses       Healthcare maintenance        Primary hypertension        Relevant Medications    amLODIPine (Norvasc) 5 mg tablet    Other Relevant Orders    Basic Metabolic Panel    CBC and Auto Differential    Follow Up In [facility] - PCP - Established

## 2024-03-25 PROBLEM — R53.1 WEAKNESS: Status: ACTIVE | Noted: 2024-03-25

## 2024-03-25 PROBLEM — J06.9 VIRAL UPPER RESPIRATORY TRACT INFECTION: Status: ACTIVE | Noted: 2024-03-25

## 2024-03-25 RX ORDER — IPRATROPIUM BROMIDE AND ALBUTEROL SULFATE 2.5; .5 MG/3ML; MG/3ML
3 SOLUTION RESPIRATORY (INHALATION) EVERY 6 HOURS
COMMUNITY
Start: 2024-03-09 | End: 2024-04-02 | Stop reason: WASHOUT

## 2024-03-28 ENCOUNTER — HOSPITAL ENCOUNTER (OUTPATIENT)
Dept: CARDIOLOGY | Facility: HOSPITAL | Age: 84
Discharge: HOME | End: 2024-03-28
Payer: MEDICARE

## 2024-03-28 DIAGNOSIS — R06.02 SHORTNESS OF BREATH: ICD-10-CM

## 2024-03-28 PROCEDURE — 93306 TTE W/DOPPLER COMPLETE: CPT | Performed by: INTERNAL MEDICINE

## 2024-03-28 PROCEDURE — 2500000004 HC RX 250 GENERAL PHARMACY W/ HCPCS (ALT 636 FOR OP/ED): Performed by: NURSE PRACTITIONER

## 2024-03-28 PROCEDURE — 93306 TTE W/DOPPLER COMPLETE: CPT

## 2024-03-28 RX ADMIN — HUMAN ALBUMIN MICROSPHERES AND PERFLUTREN 0.5 ML: 10; .22 INJECTION, SOLUTION INTRAVENOUS at 13:42

## 2024-03-29 ENCOUNTER — APPOINTMENT (OUTPATIENT)
Dept: CARDIOLOGY | Facility: HOSPITAL | Age: 84
End: 2024-03-29
Payer: MEDICARE

## 2024-03-29 ENCOUNTER — APPOINTMENT (OUTPATIENT)
Dept: PRIMARY CARE | Facility: CLINIC | Age: 84
End: 2024-03-29
Payer: MEDICARE

## 2024-03-29 LAB
AORTIC VALVE MEAN GRADIENT: 3 MMHG
AORTIC VALVE PEAK VELOCITY: 1.25 M/S
AV PEAK GRADIENT: 6.3 MMHG
AVA (PEAK VEL): 2.25 CM2
AVA (VTI): 1.72 CM2
EJECTION FRACTION APICAL 4 CHAMBER: 57
EJECTION FRACTION: 65 %
LEFT ATRIUM VOLUME AREA LENGTH INDEX BSA: 12.8 ML/M2
LEFT VENTRICLE INTERNAL DIMENSION DIASTOLE: 3.55 CM (ref 3.5–6)
LEFT VENTRICULAR OUTFLOW TRACT DIAMETER: 2 CM
MITRAL VALVE E/A RATIO: 0.68
MITRAL VALVE E/E' RATIO: 10.98
RIGHT VENTRICLE FREE WALL PEAK S': 10 CM/S
RIGHT VENTRICLE PEAK SYSTOLIC PRESSURE: 24.7 MMHG
TRICUSPID ANNULAR PLANE SYSTOLIC EXCURSION: 2.3 CM

## 2024-03-31 NOTE — PROGRESS NOTES
Counseling:  The patient was counseled regarding diagnostic results, instructions for management, risk factor reductions, prognosis, patient and family education, impressions, risks and benefits of treatment options and importance of compliance with treatment.      Chief Complaint:  The patient presents today for evaluation of SOB.     History Of Present Illness:    Micaela Leos is an 83 y.o. female patient who presents today in the company of her daughter for evaluation of SOB. Her PMH is significant for CAD, HTN, hyperlipidemia, cervical neuropathy, and trigeminal neuralgia. The patient was evaluated in the ED on 03/09/2024 for a chief complaint of SOB. While in the ED, labs showed an elevated BNP of 209 and elevated WBCs of 23.2, and CXR was negative. She was discharged home the same day with outpatient followup. Echocardiogram performed 03/28/2024 demonstrated an EF of 65%. Today, the patient reports persistent SOB, and per her daughter she has been losing weight. Repeat CBC drawn 03/15/2024 revealed elevated WBCs of 17.9. BP has been stable. The patient remains compliant with her prescribed medications.      Past Surgical History:  She has a past surgical history that includes Cataract extraction (08/08/2017); Tonsillectomy (02/20/2014); Lumbar fusion (02/20/2014); and Appendectomy (02/20/2014).      Social History:  She reports that she has been smoking cigarettes. She has been smoking an average of .5 packs per day. She has never used smokeless tobacco. She reports current alcohol use of about 4.0 standard drinks of alcohol per week. She reports that she does not use drugs.    Family History:  Family History   Problem Relation Name Age of Onset    No Known Problems Mother      Other (cardiac disorder) Father      Coronary artery disease Father      Other (CABG) Father          Allergies:  Patient has no known allergies.    Outpatient Medications:  Current Outpatient Medications   Medication Instructions     "alendronate (FOSAMAX) 70 mg, oral, Weekly    amLODIPine (NORVASC) 5 mg, oral, Daily    ASPIRIN ORAL oral, (81 MG TABS)    atorvastatin (LIPITOR) 40 mg, oral, Daily    calcium carbonate-vitamin D3 600 mg-5 mcg (200 unit) tablet 3 tablets, oral, Daily    estradiol (Estrace) 0.01 % (0.1 mg/gram) vaginal cream Place pea size amount in vagina every night for 2 weeks, than 2x/week    Gemtesa 75 mg, oral, Nightly, LOT [id number]<BR>EXP APR 2027    glucosamine/chondro myers A/C/Mn (GLUCOSAMINE-CHONDROITIN COMPLX ORAL) 1 tablet, oral, Daily    ipratropium-albuteroL (Duo-Neb) 0.5-2.5 mg/3 mL nebulizer solution 3 mL, inhalation, Every 6 hours    latanoprost (Xalatan) 0.005 % ophthalmic solution ophthalmic (eye)    loperamide (IMODIUM) 2 mg, oral, 2 times daily PRN    melatonin 5 mg capsule oral    nitrofurantoin, macrocrystal-monohydrate, (Macrobid) 100 mg capsule Take 1 pill daily for 3 months    omega-3 1,000 mg, oral, Daily    predniSONE (Deltasone) 20 mg tablet Take 3 tabs (60mg) daily for 3 days, then take 2 tabs (40mg) daily for 3 days, then take 1 tab (20mg) daily for 3 days.    promethazine-DM (Phenergan-DM) 6.25-15 mg/5 mL syrup 5 mL, oral, Every 4 hours PRN    timolol (Timoptic) 0.5 % ophthalmic solution ophthalmic (eye)    timolol (Timoptic-XR) 0.5 % ophthalmic gel-forming instill 1 drop into both eyes every morning    vits A,C,E/lutein/minerals (OCUVITE WITH LUTEIN ORAL) oral        Last Recorded Vitals:  Vitals:    04/01/24 1516   BP: 118/80   BP Location: Left arm   Pulse: 75   Weight: 52.2 kg (115 lb)   Height: 1.6 m (5' 3\")       Review of Systems   Constitutional: Positive for weight loss.   Respiratory:  Positive for shortness of breath.    All other systems reviewed and are negative.     Physical Exam:  Constitutional:       Appearance: Healthy appearance. Not in distress.   Neck:      Vascular: No JVR. JVD normal.   Pulmonary:      Effort: Pulmonary effort is normal.      Breath sounds: Normal breath sounds. No " wheezing. No rhonchi. No rales.   Chest:      Chest wall: Not tender to palpatation.   Cardiovascular:      PMI at left midclavicular line. Normal rate. Regular rhythm. Normal S1. Normal S2.       Murmurs: There is no murmur.      No gallop.  No click. No rub.   Pulses:     Intact distal pulses.   Edema:     Peripheral edema absent.   Abdominal:      General: Bowel sounds are normal.      Palpations: Abdomen is soft.      Tenderness: There is no abdominal tenderness.   Musculoskeletal: Normal range of motion.         General: No tenderness. Skin:     General: Skin is warm and dry.   Neurological:      General: No focal deficit present.      Mental Status: Alert and oriented to person, place and time.        Last Labs:  CBC -  Lab Results   Component Value Date    WBC 17.9 (H) 03/15/2024    HGB 12.5 03/15/2024    HCT 38.3 03/15/2024    MCV 98 03/15/2024     03/15/2024       CMP -  Lab Results   Component Value Date    CALCIUM 8.9 03/15/2024    PROT 6.9 03/09/2024    ALBUMIN 3.7 03/09/2024    AST 16 03/09/2024    ALT 14 03/09/2024    ALKPHOS 72 03/09/2024    BILITOT 0.8 03/09/2024       LIPID PANEL -   Lab Results   Component Value Date    CHOL 101 03/07/2024    TRIG 63 03/07/2024    HDL 45.4 03/07/2024    CHHDL 2.2 03/07/2024    LDLF 47 03/08/2023    VLDL 13 03/07/2024    NHDL 56 03/07/2024       RENAL FUNCTION PANEL -   Lab Results   Component Value Date    GLUCOSE 102 (H) 03/15/2024     (L) 03/15/2024    K 4.4 03/15/2024    CL 96 (L) 03/15/2024    CO2 31 03/15/2024    ANIONGAP 11 03/15/2024    BUN 11 03/15/2024    CREATININE 0.70 03/15/2024    CALCIUM 8.9 03/15/2024    ALBUMIN 3.7 03/09/2024        Lab Results   Component Value Date     (H) 03/15/2024    HGBA1C 5.6 03/07/2024       Last Cardiology Tests:  03/28/2024 - TTE  Left ventricular systolic function is normal with a 65% estimated ejection fraction.     08/21/2017 - Cardiac Catheterization (LH)  1. Mild to moderate coronary artery  disease.  2. Normal LV systolic function.    07/21/2017 - Stress Test  1. Abnormal stress echocardiogram due to LAD territory ischemia at a low workload. Note: Hypertensive blood pressure response may reduce specificity of the exam.  2. Maximum Predicted Heart Rate: 90%. METs: 5.  3. Blood Pressure Response: Hypertensive.  4. Electrocardiogram Findings: Normal.  5. Test Ended Due To: Patient fatigue.  6. Stress Provoked: No symptoms.     Lab review: I have personally reviewed the laboratory result(s).  Diagnostic review: I have personally reviewed the result(s) of the Echocardiogram.     Assessment/Plan   1) CAD  On ASA 81 mg daily, amlodipine 5 mg daily, atorvastatin 40 mg daily  [facility] 08/2017 with mild to moderate mild to moderate CAD  Per patient, a recent x-ray of her spine showed aortic calcifications  Carotid duplex 10/12/2023 with less than 50% stenosis bilaterally  Lipid panel 03/07/2024 with LDL of 43 (ALT/AST of 16/14 on 03/09/2024)  ED evaluation 03/09/2024 with SOB  BNP elevated at 209, WBCs elevated at 23.2  CXR negative  TTE 03/28/2024 with LVEF 65%  Repeat CBC 03/15/2024 elevated at 17.9 - see below for recommendations on leukocytosis  Reports persistent SOB   Reports weight loss  Decrease atorvastatin to 20 mg daily  F/U 3 months    2) HTN  Stable  On amlodipine 5 mg daily  F/U 3 months    3) Leukocytosis  ED evaluation 03/09/2024 with SOB  WBCs elevated at 23.2  Repeat CBC 03/15/2024 elevated at 17.9   Reports persistent SOB and weight loss  Check reticulocyte count, LDH, iron, B12, folate  Referral placed to hematology       Scribe Attestation  By signing my name below, I, Jesus Lynn   attest that this documentation has been prepared under the direction and in the presence of Geronimo Tomlinson MD.

## 2024-04-01 ENCOUNTER — OFFICE VISIT (OUTPATIENT)
Dept: CARDIOLOGY | Facility: CLINIC | Age: 84
End: 2024-04-01
Payer: MEDICARE

## 2024-04-01 VITALS
DIASTOLIC BLOOD PRESSURE: 80 MMHG | BODY MASS INDEX: 20.38 KG/M2 | HEART RATE: 75 BPM | WEIGHT: 115 LBS | HEIGHT: 63 IN | SYSTOLIC BLOOD PRESSURE: 118 MMHG

## 2024-04-01 DIAGNOSIS — I25.10 ASHD (ARTERIOSCLEROTIC HEART DISEASE): Primary | ICD-10-CM

## 2024-04-01 DIAGNOSIS — D50.8 OTHER IRON DEFICIENCY ANEMIA: ICD-10-CM

## 2024-04-01 DIAGNOSIS — E78.5 HYPERLIPIDEMIA, UNSPECIFIED HYPERLIPIDEMIA TYPE: ICD-10-CM

## 2024-04-01 PROCEDURE — 1123F ACP DISCUSS/DSCN MKR DOCD: CPT | Performed by: INTERNAL MEDICINE

## 2024-04-01 PROCEDURE — 1157F ADVNC CARE PLAN IN RCRD: CPT | Performed by: INTERNAL MEDICINE

## 2024-04-01 PROCEDURE — 1160F RVW MEDS BY RX/DR IN RCRD: CPT | Performed by: INTERNAL MEDICINE

## 2024-04-01 PROCEDURE — 3079F DIAST BP 80-89 MM HG: CPT | Performed by: INTERNAL MEDICINE

## 2024-04-01 PROCEDURE — 93000 ELECTROCARDIOGRAM COMPLETE: CPT | Performed by: INTERNAL MEDICINE

## 2024-04-01 PROCEDURE — 3074F SYST BP LT 130 MM HG: CPT | Performed by: INTERNAL MEDICINE

## 2024-04-01 PROCEDURE — 99213 OFFICE O/P EST LOW 20 MIN: CPT | Performed by: INTERNAL MEDICINE

## 2024-04-01 PROCEDURE — 1159F MED LIST DOCD IN RCRD: CPT | Performed by: INTERNAL MEDICINE

## 2024-04-01 RX ORDER — ATORVASTATIN CALCIUM 20 MG/1
20 TABLET, FILM COATED ORAL DAILY
Qty: 90 TABLET | Refills: 3 | Status: SHIPPED | OUTPATIENT
Start: 2024-04-01 | End: 2025-04-01

## 2024-04-01 ASSESSMENT — ENCOUNTER SYMPTOMS
WEIGHT LOSS: 1
SHORTNESS OF BREATH: 1
OCCASIONAL FEELINGS OF UNSTEADINESS: 0
DEPRESSION: 0
LOSS OF SENSATION IN FEET: 0

## 2024-04-01 NOTE — PATIENT INSTRUCTIONS
Decrease atorvastatin to 20 mg daily. If you have any of the 40 mg tablets left, you can cut these in half and take one half tablet once daily until they are finished. A prescription for the new dose has been sent to your pharmacy.   Continue all current medications as prescribed.  Please have blood work drawn.  As your white blood cell count is elevated, Dr. Tomlinson has placed a referral to hematology.  Followup with Dr. Tomlinson in 3 months.    If you have any questions or cardiac concerns, please call our office at [phone].

## 2024-04-01 NOTE — LETTER
April 1, 2024     Jeni Payton, APRN-CNP  [address]  [facility] [address]    Patient: Micaela Leos   YOB: 1940   Date of Visit: 4/1/2024       Dear Dr. Jeni Payton, APRN-CNP:    Thank you for referring Micaela Leos to me for evaluation. Below are my notes for this consultation.  If you have questions, please do not hesitate to call me. I look forward to following your patient along with you.       Sincerely,     Geronimo Tomlinson MD      CC: No Recipients  ______________________________________________________________________________________    Counseling:  The patient was counseled regarding diagnostic results, instructions for management, risk factor reductions, prognosis, patient and family education, impressions, risks and benefits of treatment options and importance of compliance with treatment.      Chief Complaint:  The patient presents today for evaluation of SOB.     History Of Present Illness:    Micaela Leos is an 83 y.o. female patient who presents today in the company of her daughter for evaluation of SOB. Her PMH is significant for CAD, HTN, hyperlipidemia, cervical neuropathy, and trigeminal neuralgia. The patient was evaluated in the ED on 03/09/2024 for a chief complaint of SOB. While in the ED, labs showed an elevated BNP of 209 and elevated WBCs of 23.2, and CXR was negative. She was discharged home the same day with outpatient followup. Echocardiogram performed 03/28/2024 demonstrated an EF of 65%. Today, the patient reports persistent SOB, and per her daughter she has been losing weight. Repeat CBC drawn 03/15/2024 revealed elevated WBCs of 17.9. BP has been stable. The patient remains compliant with her prescribed medications.      Past Surgical History:  She has a past surgical history that includes Cataract extraction (08/08/2017); Tonsillectomy (02/20/2014); Lumbar fusion (02/20/2014); and Appendectomy  (02/20/2014).      Social History:  She reports that she has been smoking cigarettes. She has been smoking an average of .5 packs per day. She has never used smokeless tobacco. She reports current alcohol use of about 4.0 standard drinks of alcohol per week. She reports that she does not use drugs.    Family History:  Family History   Problem Relation Name Age of Onset   • No Known Problems Mother     • Other (cardiac disorder) Father     • Coronary artery disease Father     • Other (CABG) Father          Allergies:  Patient has no known allergies.    Outpatient Medications:  Current Outpatient Medications   Medication Instructions   • alendronate (FOSAMAX) 70 mg, oral, Weekly   • amLODIPine (NORVASC) 5 mg, oral, Daily   • ASPIRIN ORAL oral, (81 MG TABS)   • atorvastatin (LIPITOR) 40 mg, oral, Daily   • calcium carbonate-vitamin D3 600 mg-5 mcg (200 unit) tablet 3 tablets, oral, Daily   • estradiol (Estrace) 0.01 % (0.1 mg/gram) vaginal cream Place pea size amount in vagina every night for 2 weeks, than 2x/week   • Gemtesa 75 mg, oral, Nightly, LOT [id number]<BR>EXP APR 2027   • glucosamine/chondro myers A/C/Mn (GLUCOSAMINE-CHONDROITIN COMPLX ORAL) 1 tablet, oral, Daily   • ipratropium-albuteroL (Duo-Neb) 0.5-2.5 mg/3 mL nebulizer solution 3 mL, inhalation, Every 6 hours   • latanoprost (Xalatan) 0.005 % ophthalmic solution ophthalmic (eye)   • loperamide (IMODIUM) 2 mg, oral, 2 times daily PRN   • melatonin 5 mg capsule oral   • nitrofurantoin, macrocrystal-monohydrate, (Macrobid) 100 mg capsule Take 1 pill daily for 3 months   • omega-3 1,000 mg, oral, Daily   • predniSONE (Deltasone) 20 mg tablet Take 3 tabs (60mg) daily for 3 days, then take 2 tabs (40mg) daily for 3 days, then take 1 tab (20mg) daily for 3 days.   • promethazine-DM (Phenergan-DM) 6.25-15 mg/5 mL syrup 5 mL, oral, Every 4 hours PRN   • timolol (Timoptic) 0.5 % ophthalmic solution ophthalmic (eye)   • timolol (Timoptic-XR) 0.5 % ophthalmic gel-forming  "instill 1 drop into both eyes every morning   • vits A,C,E/lutein/minerals (OCUVITE WITH LUTEIN ORAL) oral        Last Recorded Vitals:  Vitals:    04/01/24 1516   BP: 118/80   BP Location: Left arm   Pulse: 75   Weight: 52.2 kg (115 lb)   Height: 1.6 m (5' 3\")       Review of Systems   Constitutional: Positive for weight loss.   Respiratory:  Positive for shortness of breath.    All other systems reviewed and are negative.     Physical Exam:  Constitutional:       Appearance: Healthy appearance. Not in distress.   Neck:      Vascular: No JVR. JVD normal.   Pulmonary:      Effort: Pulmonary effort is normal.      Breath sounds: Normal breath sounds. No wheezing. No rhonchi. No rales.   Chest:      Chest wall: Not tender to palpatation.   Cardiovascular:      PMI at left midclavicular line. Normal rate. Regular rhythm. Normal S1. Normal S2.       Murmurs: There is no murmur.      No gallop.  No click. No rub.   Pulses:     Intact distal pulses.   Edema:     Peripheral edema absent.   Abdominal:      General: Bowel sounds are normal.      Palpations: Abdomen is soft.      Tenderness: There is no abdominal tenderness.   Musculoskeletal: Normal range of motion.         General: No tenderness. Skin:     General: Skin is warm and dry.   Neurological:      General: No focal deficit present.      Mental Status: Alert and oriented to person, place and time.        Last Labs:  CBC -  Lab Results   Component Value Date    WBC 17.9 (H) 03/15/2024    HGB 12.5 03/15/2024    HCT 38.3 03/15/2024    MCV 98 03/15/2024     03/15/2024       CMP -  Lab Results   Component Value Date    CALCIUM 8.9 03/15/2024    PROT 6.9 03/09/2024    ALBUMIN 3.7 03/09/2024    AST 16 03/09/2024    ALT 14 03/09/2024    ALKPHOS 72 03/09/2024    BILITOT 0.8 03/09/2024       LIPID PANEL -   Lab Results   Component Value Date    CHOL 101 03/07/2024    TRIG 63 03/07/2024    HDL 45.4 03/07/2024    CHHDL 2.2 03/07/2024    LDLF 47 03/08/2023    VLDL 13 " 03/07/2024    NHDL 56 03/07/2024       RENAL FUNCTION PANEL -   Lab Results   Component Value Date    GLUCOSE 102 (H) 03/15/2024     (L) 03/15/2024    K 4.4 03/15/2024    CL 96 (L) 03/15/2024    CO2 31 03/15/2024    ANIONGAP 11 03/15/2024    BUN 11 03/15/2024    CREATININE 0.70 03/15/2024    CALCIUM 8.9 03/15/2024    ALBUMIN 3.7 03/09/2024        Lab Results   Component Value Date     (H) 03/15/2024    HGBA1C 5.6 03/07/2024       Last Cardiology Tests:  03/28/2024 - TTE  Left ventricular systolic function is normal with a 65% estimated ejection fraction.     08/21/2017 - Cardiac Catheterization (LH)  1. Mild to moderate coronary artery disease.  2. Normal LV systolic function.    07/21/2017 - Stress Test  1. Abnormal stress echocardiogram due to LAD territory ischemia at a low workload. Note: Hypertensive blood pressure response may reduce specificity of the exam.  2. Maximum Predicted Heart Rate: 90%. METs: 5.  3. Blood Pressure Response: Hypertensive.  4. Electrocardiogram Findings: Normal.  5. Test Ended Due To: Patient fatigue.  6. Stress Provoked: No symptoms.     Lab review: I have personally reviewed the laboratory result(s).  Diagnostic review: I have personally reviewed the result(s) of the Echocardiogram.     Assessment/Plan  1) CAD  On ASA 81 mg daily, amlodipine 5 mg daily, atorvastatin 40 mg daily  [facility] 08/2017 with mild to moderate mild to moderate CAD  Per patient, a recent x-ray of her spine showed aortic calcifications  Carotid duplex 10/12/2023 with less than 50% stenosis bilaterally  Lipid panel 03/07/2024 with LDL of 43 (ALT/AST of 16/14 on 03/09/2024)  ED evaluation 03/09/2024 with SOB  BNP elevated at 209, WBCs elevated at 23.2  CXR negative  TTE 03/28/2024 with LVEF 65%  Repeat CBC 03/15/2024 elevated at 17.9 - see below for recommendations on leukocytosis  Reports persistent SOB   Reports weight loss  Decrease atorvastatin to 20 mg daily  F/U 3 months    2) HTN  Stable  On  amlodipine 5 mg daily  F/U 3 months    3) Leukocytosis  ED evaluation 03/09/2024 with SOB  WBCs elevated at 23.2  Repeat CBC 03/15/2024 elevated at 17.9   Reports persistent SOB and weight loss  Check reticulocyte count, LDH, iron, B12, folate  Referral placed to hematology       Scribe Attestation  By signing my name below, I, Jesus Lynn   attest that this documentation has been prepared under the direction and in the presence of Geronimo Tomlinson MD.

## 2024-04-02 ENCOUNTER — APPOINTMENT (OUTPATIENT)
Dept: PRIMARY CARE | Facility: CLINIC | Age: 84
End: 2024-04-02
Payer: MEDICARE

## 2024-04-05 ENCOUNTER — LAB (OUTPATIENT)
Dept: LAB | Facility: LAB | Age: 84
End: 2024-04-05
Payer: MEDICARE

## 2024-04-05 DIAGNOSIS — I25.10 ASHD (ARTERIOSCLEROTIC HEART DISEASE): ICD-10-CM

## 2024-04-05 DIAGNOSIS — E78.5 HYPERLIPIDEMIA, UNSPECIFIED HYPERLIPIDEMIA TYPE: ICD-10-CM

## 2024-04-05 DIAGNOSIS — D50.8 OTHER IRON DEFICIENCY ANEMIA: ICD-10-CM

## 2024-04-05 LAB
FOLATE SERPL-MCNC: 22 NG/ML
HGB RETIC QN: 34 PG (ref 28–38)
IMMATURE RETIC FRACTION: 10 %
IRON SATN MFR SERPL: 23 % (ref 25–45)
IRON SERPL-MCNC: 74 UG/DL (ref 35–150)
LDH SERPL L TO P-CCNC: 142 U/L (ref 84–246)
RETICS #: 0.1 X10*6/UL (ref 0.02–0.11)
RETICS/RBC NFR AUTO: 2.7 % (ref 0.5–2)
TIBC SERPL-MCNC: 316 UG/DL (ref 240–445)
UIBC SERPL-MCNC: 242 UG/DL (ref 110–370)
VIT B12 SERPL-MCNC: 414 PG/ML (ref 211–911)

## 2024-04-05 PROCEDURE — 83550 IRON BINDING TEST: CPT

## 2024-04-05 PROCEDURE — 83540 ASSAY OF IRON: CPT

## 2024-04-05 PROCEDURE — 82746 ASSAY OF FOLIC ACID SERUM: CPT

## 2024-04-05 PROCEDURE — 36415 COLL VENOUS BLD VENIPUNCTURE: CPT

## 2024-04-05 PROCEDURE — 83615 LACTATE (LD) (LDH) ENZYME: CPT

## 2024-04-05 PROCEDURE — 85045 AUTOMATED RETICULOCYTE COUNT: CPT

## 2024-04-05 PROCEDURE — 82607 VITAMIN B-12: CPT

## 2024-04-12 ENCOUNTER — ANCILLARY PROCEDURE (OUTPATIENT)
Dept: CARDIOLOGY | Facility: CLINIC | Age: 84
End: 2024-04-12
Payer: MEDICARE

## 2024-04-12 DIAGNOSIS — R55 SYNCOPE AND COLLAPSE: Primary | ICD-10-CM

## 2024-04-12 DIAGNOSIS — R55 SYNCOPE AND COLLAPSE: ICD-10-CM

## 2024-04-12 PROCEDURE — 93272 ECG/REVIEW INTERPRET ONLY: CPT | Performed by: INTERNAL MEDICINE

## 2024-04-12 NOTE — PROGRESS NOTES
I was contacted by Love, daughter, via [employer] Secure Chat, reporting patient [place] out [place] yesterday. She notified Dr Tomlinson of this event and Dr Tomlinson wants to order a 30 day event monitor. Order placed. Love will  monitor from office and place on patient.

## 2024-04-25 ENCOUNTER — TELEPHONE (OUTPATIENT)
Dept: PRIMARY CARE | Facility: CLINIC | Age: 84
End: 2024-04-25
Payer: MEDICARE

## 2024-04-25 DIAGNOSIS — R19.7 DIARRHEA, UNSPECIFIED TYPE: Primary | ICD-10-CM

## 2024-04-27 ENCOUNTER — OFFICE VISIT (OUTPATIENT)
Dept: OTOLARYNGOLOGY | Facility: CLINIC | Age: 84
End: 2024-04-27
Payer: MEDICARE

## 2024-04-27 VITALS — WEIGHT: 115 LBS | HEIGHT: 63 IN | BODY MASS INDEX: 20.38 KG/M2

## 2024-04-27 DIAGNOSIS — H90.3 SENSORINEURAL HEARING LOSS (SNHL) OF BOTH EARS: Primary | ICD-10-CM

## 2024-04-27 DIAGNOSIS — H81.11 BENIGN POSITIONAL VERTIGO, RIGHT: ICD-10-CM

## 2024-04-27 DIAGNOSIS — H61.23 BILATERAL IMPACTED CERUMEN: ICD-10-CM

## 2024-04-27 PROCEDURE — 1160F RVW MEDS BY RX/DR IN RCRD: CPT | Performed by: OTOLARYNGOLOGY

## 2024-04-27 PROCEDURE — 99203 OFFICE O/P NEW LOW 30 MIN: CPT | Performed by: OTOLARYNGOLOGY

## 2024-04-27 PROCEDURE — 1159F MED LIST DOCD IN RCRD: CPT | Performed by: OTOLARYNGOLOGY

## 2024-04-27 PROCEDURE — 1123F ACP DISCUSS/DSCN MKR DOCD: CPT | Performed by: OTOLARYNGOLOGY

## 2024-04-27 PROCEDURE — 1157F ADVNC CARE PLAN IN RCRD: CPT | Performed by: OTOLARYNGOLOGY

## 2024-04-27 PROCEDURE — 95992 CANALITH REPOSITIONING PROC: CPT | Performed by: OTOLARYNGOLOGY

## 2024-04-27 PROCEDURE — 69210 REMOVE IMPACTED EAR WAX UNI: CPT | Performed by: OTOLARYNGOLOGY

## 2024-04-27 ASSESSMENT — ENCOUNTER SYMPTOMS
FATIGUE: 1
DIZZINESS: 1

## 2024-04-27 NOTE — PROGRESS NOTES
Subjective   Patient ID: Micaela Leos is a 83 y.o. female  HPI  Patient is complaining of fullness in the ears bilaterally.  She is also complaining of vertigo when she places her head down to the right side.  She has no otalgia and no otorrhea.  She is status a fall approximately 2 weeks ago and the dizziness seems to have started after the fall.  Review of Systems   Constitutional:  Positive for fatigue.   Neurological:  Positive for dizziness.       Objective   Physical Exam  The following elements of a brief ear nose and throat exam were performed: External ear canals and tympanic membranes, external nose and nasal passages, oral cavity, palpation of the neck, percussion of the face, palpation of the thyroid.    There is cerumen impaction bilaterally and this was cleared using speculum and curettes.  The tympanic membranes are clear and mobile.  There is clinical evidence of hearing loss noted during conversation.  There is no spontaneous nystagmus noted.  The remainder of her exam was within normal limits.  The [place]-Hallpike maneuver was performed and was noted to be positive on the right side with latency and fatigue.  The Epley maneuver was subsequently performed and she was noted to have some immediate improvement with repetition of the [place]-Hallpike maneuver.    Ear cerumen removal    Date/Time: 4/27/2024 11:37 AM    Performed by: Narciso Galvez MD  Authorized by: Narciso Galvez MD    Consent:     Consent obtained:  Verbal    Risks discussed:  Pain  Procedure details:     Location:  L ear and R ear    Procedure type: curette    Canalith Repositioning for BPPV    Date/Time: 4/27/2024 11:38 AM    Performed by: Narciso Galvez MD  Authorized by: Narciso Galvez MD    Consent:     Consent obtained:  Verbal    Procedural risks discussed: Persistent or worsening dizziness.  Procedure specific details:      The Epley maneuver was performed on the right side and the patient tolerated the procedure  well.      Assessment/Plan   Diagnoses and all orders for this visit:  Sensorineural hearing loss (SNHL) of both ears (Primary)  -     Tympanometry Only; Future  -     Comprehensive hearing test; Future  Bilateral impacted cerumen  -     Ear cerumen removal  Benign positional vertigo, right  -     Canalith Repositioning for BPPV     1.  Benign positional vertigo on the right side which appears to have responded to the Epley maneuver although patient continues to complain of some sensation of dizziness with repetition of the [place]-Hallpike maneuver.  2.  Chronic history of hearing loss.  The patient was scheduled for an audiogram and tympanogram.  3.  Bilateral cerumen impaction cleared today.

## 2024-04-29 ENCOUNTER — LAB (OUTPATIENT)
Dept: LAB | Facility: LAB | Age: 84
End: 2024-04-29
Payer: MEDICARE

## 2024-04-29 DIAGNOSIS — R19.7 DIARRHEA, UNSPECIFIED TYPE: ICD-10-CM

## 2024-04-29 LAB
ANION GAP SERPL CALC-SCNC: 13 MMOL/L (ref 10–20)
BASOPHILS # BLD AUTO: 0.07 X10*3/UL (ref 0–0.1)
BASOPHILS NFR BLD AUTO: 0.6 %
BUN SERPL-MCNC: 7 MG/DL (ref 6–23)
CALCIUM SERPL-MCNC: 9.8 MG/DL (ref 8.6–10.3)
CHLORIDE SERPL-SCNC: 99 MMOL/L (ref 98–107)
CO2 SERPL-SCNC: 28 MMOL/L (ref 21–32)
CREAT SERPL-MCNC: 0.68 MG/DL (ref 0.5–1.05)
EGFRCR SERPLBLD CKD-EPI 2021: 87 ML/MIN/1.73M*2
EOSINOPHIL # BLD AUTO: 0.09 X10*3/UL (ref 0–0.4)
EOSINOPHIL NFR BLD AUTO: 0.8 %
ERYTHROCYTE [DISTWIDTH] IN BLOOD BY AUTOMATED COUNT: 12.7 % (ref 11.5–14.5)
GLUCOSE SERPL-MCNC: 98 MG/DL (ref 74–99)
HCT VFR BLD AUTO: 37.1 % (ref 36–46)
HGB BLD-MCNC: 12.1 G/DL (ref 12–16)
IMM GRANULOCYTES # BLD AUTO: 0.11 X10*3/UL (ref 0–0.5)
IMM GRANULOCYTES NFR BLD AUTO: 1 % (ref 0–0.9)
LYMPHOCYTES # BLD AUTO: 1.93 X10*3/UL (ref 0.8–3)
LYMPHOCYTES NFR BLD AUTO: 17 %
MCH RBC QN AUTO: 31.8 PG (ref 26–34)
MCHC RBC AUTO-ENTMCNC: 32.6 G/DL (ref 32–36)
MCV RBC AUTO: 98 FL (ref 80–100)
MONOCYTES # BLD AUTO: 0.86 X10*3/UL (ref 0.05–0.8)
MONOCYTES NFR BLD AUTO: 7.6 %
NEUTROPHILS # BLD AUTO: 8.3 X10*3/UL (ref 1.6–5.5)
NEUTROPHILS NFR BLD AUTO: 73 %
NRBC BLD-RTO: 0 /100 WBCS (ref 0–0)
PLATELET # BLD AUTO: 438 X10*3/UL (ref 150–450)
POTASSIUM SERPL-SCNC: 4.6 MMOL/L (ref 3.5–5.3)
RBC # BLD AUTO: 3.8 X10*6/UL (ref 4–5.2)
SODIUM SERPL-SCNC: 135 MMOL/L (ref 136–145)
WBC # BLD AUTO: 11.4 X10*3/UL (ref 4.4–11.3)

## 2024-04-29 PROCEDURE — 80048 BASIC METABOLIC PNL TOTAL CA: CPT

## 2024-04-29 PROCEDURE — 85025 COMPLETE CBC W/AUTO DIFF WBC: CPT

## 2024-04-29 PROCEDURE — 36415 COLL VENOUS BLD VENIPUNCTURE: CPT

## 2024-05-01 ENCOUNTER — OFFICE VISIT (OUTPATIENT)
Dept: HEMATOLOGY/ONCOLOGY | Facility: CLINIC | Age: 84
End: 2024-05-01
Payer: MEDICARE

## 2024-05-01 VITALS
SYSTOLIC BLOOD PRESSURE: 112 MMHG | DIASTOLIC BLOOD PRESSURE: 68 MMHG | RESPIRATION RATE: 16 BRPM | WEIGHT: 116.51 LBS | HEIGHT: 62 IN | BODY MASS INDEX: 21.44 KG/M2 | HEART RATE: 71 BPM | OXYGEN SATURATION: 96 % | TEMPERATURE: 98.4 F

## 2024-05-01 DIAGNOSIS — D72.823 LEUKEMOID REACTION: Primary | ICD-10-CM

## 2024-05-01 DIAGNOSIS — I25.10 ASHD (ARTERIOSCLEROTIC HEART DISEASE): ICD-10-CM

## 2024-05-01 DIAGNOSIS — E78.5 HYPERLIPIDEMIA, UNSPECIFIED HYPERLIPIDEMIA TYPE: ICD-10-CM

## 2024-05-01 LAB
BASOPHILS # BLD AUTO: 0.04 X10*3/UL (ref 0–0.1)
BASOPHILS NFR BLD AUTO: 0.4 %
EOSINOPHIL # BLD AUTO: 0.15 X10*3/UL (ref 0–0.4)
EOSINOPHIL NFR BLD AUTO: 1.5 %
ERYTHROCYTE [DISTWIDTH] IN BLOOD BY AUTOMATED COUNT: 12.6 % (ref 11.5–14.5)
HCT VFR BLD AUTO: 34.7 % (ref 36–46)
HGB BLD-MCNC: 11.2 G/DL (ref 12–16)
IMM GRANULOCYTES # BLD AUTO: 0.04 X10*3/UL (ref 0–0.5)
IMM GRANULOCYTES NFR BLD AUTO: 0.4 % (ref 0–0.9)
LYMPHOCYTES # BLD AUTO: 1.87 X10*3/UL (ref 0.8–3)
LYMPHOCYTES NFR BLD AUTO: 19 %
MCH RBC QN AUTO: 31.8 PG (ref 26–34)
MCHC RBC AUTO-ENTMCNC: 32.3 G/DL (ref 32–36)
MCV RBC AUTO: 99 FL (ref 80–100)
MONOCYTES # BLD AUTO: 0.87 X10*3/UL (ref 0.05–0.8)
MONOCYTES NFR BLD AUTO: 8.8 %
NEUTROPHILS # BLD AUTO: 6.87 X10*3/UL (ref 1.6–5.5)
NEUTROPHILS NFR BLD AUTO: 69.9 %
PLATELET # BLD AUTO: 383 X10*3/UL (ref 150–450)
RBC # BLD AUTO: 3.52 X10*6/UL (ref 4–5.2)
WBC # BLD AUTO: 9.8 X10*3/UL (ref 4.4–11.3)

## 2024-05-01 PROCEDURE — 99214 OFFICE O/P EST MOD 30 MIN: CPT | Performed by: INTERNAL MEDICINE

## 2024-05-01 PROCEDURE — 3078F DIAST BP <80 MM HG: CPT | Performed by: INTERNAL MEDICINE

## 2024-05-01 PROCEDURE — 1159F MED LIST DOCD IN RCRD: CPT | Performed by: INTERNAL MEDICINE

## 2024-05-01 PROCEDURE — 99204 OFFICE O/P NEW MOD 45 MIN: CPT | Performed by: INTERNAL MEDICINE

## 2024-05-01 PROCEDURE — 1160F RVW MEDS BY RX/DR IN RCRD: CPT | Performed by: INTERNAL MEDICINE

## 2024-05-01 PROCEDURE — 1157F ADVNC CARE PLAN IN RCRD: CPT | Performed by: INTERNAL MEDICINE

## 2024-05-01 PROCEDURE — 36415 COLL VENOUS BLD VENIPUNCTURE: CPT | Performed by: INTERNAL MEDICINE

## 2024-05-01 PROCEDURE — 3074F SYST BP LT 130 MM HG: CPT | Performed by: INTERNAL MEDICINE

## 2024-05-01 PROCEDURE — 85025 COMPLETE CBC W/AUTO DIFF WBC: CPT | Performed by: INTERNAL MEDICINE

## 2024-05-01 PROCEDURE — 1125F AMNT PAIN NOTED PAIN PRSNT: CPT | Performed by: INTERNAL MEDICINE

## 2024-05-01 PROCEDURE — 1123F ACP DISCUSS/DSCN MKR DOCD: CPT | Performed by: INTERNAL MEDICINE

## 2024-05-01 ASSESSMENT — PAIN SCALES - GENERAL: PAINLEVEL: 5

## 2024-05-01 ASSESSMENT — PATIENT HEALTH QUESTIONNAIRE - PHQ9
2. FEELING DOWN, DEPRESSED OR HOPELESS: NOT AT ALL
1. LITTLE INTEREST OR PLEASURE IN DOING THINGS: NOT AT ALL
SUM OF ALL RESPONSES TO PHQ9 QUESTIONS 1 AND 2: 0

## 2024-05-01 ASSESSMENT — COLUMBIA-SUICIDE SEVERITY RATING SCALE - C-SSRS
6. HAVE YOU EVER DONE ANYTHING, STARTED TO DO ANYTHING, OR PREPARED TO DO ANYTHING TO END YOUR LIFE?: NO
1. IN THE PAST MONTH, HAVE YOU WISHED YOU WERE DEAD OR WISHED YOU COULD GO TO SLEEP AND NOT WAKE UP?: NO
2. HAVE YOU ACTUALLY HAD ANY THOUGHTS OF KILLING YOURSELF?: NO

## 2024-05-01 NOTE — PROGRESS NOTES
Micaela Leos is a 83 y.o. female evaluated for leukocytosis  Subjective   Micaela Leos is an 83 y.o. female patient who presents today in the company of her daughter for evaluation of SOB. Her PMH is significant for CAD, HTN, hyperlipidemia, cervical neuropathy, and trigeminal neuralgia. The patient was evaluated in the ED on 03/09/2024 for a chief complaint of SOB. While in the ED, labs showed an elevated BNP of 209 and elevated WBCs of 23.2, and CXR was negative. She was discharged home the same day with outpatient followup. Echocardiogram performed 03/28/2024 demonstrated an EF of 65% .   On March 15, 2024, CBC was repeated and WBC count was 17.9.  Patient also has macrocytosis with normal folic acid, B12 level and LDH.    Hematology consultation requested because of leukocytosis.    History of diarrhea, colonoscopy done and: Biopsy positive for possible lymphocytic colitis and EGD was within normal limit.    Patient also has a history of weight loss but not eating very well.    Today patient feeling better, no fever, no night sweats, no nausea vomiting, history of diarrhea, no chest pain, no abdominal pain.    Patient has history of dizziness or vertigo evaluated by ENT and bilateral wax was removed.        Objective   Patient is doing very well at this time patient still acting, draining herself.  Appetite is okay, at this time body weight is stable, patient has persistent diarrhea at least 2-3 bowel movement every day and most of the time in the morning time.  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, no nausea vomiting, no abdominal pain, has history of arthritis    ROS  Review of systems positive for weakness, arthritis, history of fall, diarrhea history of weight loss now body weight is stable still active    Past Medical History:   Diagnosis Date    Glaucoma      Coronary artery disease, hypertension, colitis  Past Surgical History:   Procedure Laterality Date     APPENDECTOMY  02/20/2014    Appendectomy    CATARACT EXTRACTION  08/08/2017    Cataract Surgery    LUMBAR FUSION  02/20/2014    Lumbar Vertebral Fusion    TONSILLECTOMY  02/20/2014    Tonsillectomy        Family History   Problem Relation Name Age of Onset    No Known Problems Mother      Other (cardiac disorder) Father      Coronary artery disease Father      Other (CABG) Father        Social History     Tobacco Use   Smoking Status Every Day    Current packs/day: 0.50    Types: Cigarettes   Smokeless Tobacco Never      Current Outpatient Medications:     alendronate (Fosamax) 70 mg tablet, Take 1 tablet (70 mg) by mouth 1 (one) time per week., Disp: 12 tablet, Rfl: 3    amLODIPine (Norvasc) 5 mg tablet, Take 1 tablet (5 mg) by mouth once daily., Disp: 90 tablet, Rfl: 3    ASPIRIN ORAL, Take by mouth. (81 MG TABS), Disp: , Rfl:     atorvastatin (Lipitor) 20 mg tablet, Take 1 tablet (20 mg) by mouth once daily., Disp: 90 tablet, Rfl: 3    calcium carbonate-vitamin D3 600 mg-5 mcg (200 unit) tablet, Take 3 tablets by mouth once daily., Disp: , Rfl:     estradiol (Estrace) 0.01 % (0.1 mg/gram) vaginal cream, Place pea size amount in vagina every night for 2 weeks, than 2x/week (Patient not taking: Reported on 4/1/2024), Disp: 42.5 g, Rfl: 12    latanoprost (Xalatan) 0.005 % ophthalmic solution, Administer into affected eye(s)., Disp: , Rfl:     loperamide (Imodium) 2 mg capsule, Take 1 capsule (2 mg) by mouth 2 times a day as needed for diarrhea., Disp: 120 capsule, Rfl: 3    melatonin 5 mg capsule, Take by mouth., Disp: , Rfl:     nitrofurantoin, macrocrystal-monohydrate, (Macrobid) 100 mg capsule, Take 1 pill daily for 3 months, Disp: 90 capsule, Rfl: 0    omega-3 1,000 mg capsule capsule, Take 1 capsule (1,000 mg) by mouth once daily., Disp: , Rfl:     predniSONE (Deltasone) 20 mg tablet, Take 3 tabs (60mg) daily for 3 days, then take 2 tabs (40mg) daily for 3 days, then take 1 tab (20mg) daily for 3 days. (Patient  "not taking: Reported on 4/1/2024), Disp: 18 tablet, Rfl: 0    timolol (Timoptic-XR) 0.5 % ophthalmic gel-forming, instill 1 drop into both eyes every morning, Disp: , Rfl:     vibegron (Gemtesa) 75 mg tablet, Take 1 tablet (75 mg) by mouth once daily at bedtime. LOT [id number] EXP APR 2027 (Patient not taking: Reported on 4/1/2024), Disp: 84 tablet, Rfl: 0    vits A,C,E/lutein/minerals (OCUVITE WITH LUTEIN ORAL), Take by mouth., Disp: , Rfl:       Last Recorded Vitals  Blood pressure 112/68, pulse 71, temperature 36.9 °C (98.4 °F), resp. rate 16, height (S) 1.576 m (5' 2.05\"), weight 52.9 kg (116 lb 8.2 oz), SpO2 96%.  Physical examination  Vitals are stable, patient is awake and alert, no headache    Oropharynx examination did not show any lesion no redness mucosa moist    HEENT examination normal limits    Neck supple, no cervical lymphadenopathy, no carotid bruit    Lungs good air movement on the both side, no wheezing    Heart with normal regular rhythm    Abdomen is soft, nontender, no hepatosplenomegaly, no hernias or masses noted    Extremity no edema cyanosis    Bilateral breast examination, nontender no mass    Lymphatic no peripheral lymphadenopathy    Neuro examination nonfocal  Relevant Results  mponent  Ref Range & Units 14:22  (5/1/24) 2 d ago  (4/29/24) 1 mo ago  (3/15/24) 1 mo ago  (3/9/24) 1 mo ago  (3/7/24) 7 mo ago  (9/25/23) 1 yr ago  (3/8/23)   WBC  4.4 - 11.3 x10*3/uL 9.8 11.4 High  17.9 High  23.2 High  10.0 8.4 R 8.6 R   RBC  4.00 - 5.20 x10*6/uL 3.52 Low  3.80 Low  3.90 Low  3.75 Low  3.95 Low  4.02 R 4.13 R   Hemoglobin  12.0 - 16.0 g/dL 11.2 Low  12.1 12.5 12.3 12.6 12.7 13.3   Hematocrit  36.0 - 46.0 % 34.7 Low  37.1 38.3 36.0 39.9 39.8 41.3   MCV  80 - 100 fL 99 98 98 96 101 High  99 100   MCH  26.0 - 34.0 pg 31.8 31.8 32.1 32.8 31.9     MCHC  32.0 - 36.0 g/dL 32.3 32.6 32.6 34.2 31.6 Low  31.9 Low  32.2   RDW  11.5 - 14.5 % 12.6 12.7 12.5 12.1 12.4 12.7 12.4   Platelets  150 - 450 x10*3/uL " 383 438 368 207        ef Range & Units 3 wk ago   Vitamin B12  211 - 911 pg/mL 414     5.0 ng/mL 22.0   Resulting Agency [facility]     its 3 wk ago   Iron  35 - 150 ug/dL 74   UIBC  110 - 370 ug/dL 242   TIBC  240 - 445 ug/dL 316   % Saturation  25 - 45 % 23 Low       Range & Units 3 wk ago   LDH  84 - 246 U/L 142   March 2024, CT chest abdominal pelvis, no hepatosplenomegaly no lymphadenopathy          Assessment/Plan   #1 reactive leukocytosis which has been resolved, today WBC count 9.8 with normal differential count.  Patient could have underlying inflammatory process or possible UTI causing reactive leukocytosis.  Sometimes small elevation leukocyte could be due to colitis.  Hematological point of view no further workup    2.  Mild macrocytosis, B12 folic acid LDH within normal limit and it could be due to hypoproliferative stage of bone marrow which could be age-related, no anemia no further workup    3.  Lymphocyte colitis, GI follow-up, I suggested to patient tried lactose-free milk    Hematological point of view no further workup, lab result discussed with the patient and with her daughter, no follow-up.  I spent 45 minutes in the professional and overall care of this patient.    Hawk Boucher MD

## 2024-05-01 NOTE — PATIENT INSTRUCTIONS
Appointment today with Dr. Boucher for leukocytosis (elevated White Blood Cell count).   Your White blood cell count today was normal.     No additional follow up is needed at this time.

## 2024-05-02 ENCOUNTER — LAB (OUTPATIENT)
Dept: LAB | Facility: LAB | Age: 84
End: 2024-05-02
Payer: MEDICARE

## 2024-05-02 ENCOUNTER — APPOINTMENT (OUTPATIENT)
Dept: LAB | Facility: LAB | Age: 84
End: 2024-05-02
Payer: MEDICARE

## 2024-05-02 DIAGNOSIS — R19.7 DIARRHEA, UNSPECIFIED TYPE: ICD-10-CM

## 2024-05-02 DIAGNOSIS — R19.7 DIARRHEA, UNSPECIFIED TYPE: Primary | ICD-10-CM

## 2024-05-02 PROBLEM — H90.3 SENSORINEURAL HEARING LOSS (SNHL) OF BOTH EARS: Status: ACTIVE | Noted: 2024-05-02

## 2024-05-02 PROBLEM — R06.02 SHORTNESS OF BREATH: Status: ACTIVE | Noted: 2024-03-15

## 2024-05-02 PROBLEM — D72.823 LEUKEMOID REACTION: Status: ACTIVE | Noted: 2024-05-01

## 2024-05-02 PROBLEM — H81.10 BENIGN PAROXYSMAL POSITIONAL VERTIGO: Status: ACTIVE | Noted: 2024-05-02

## 2024-05-02 PROBLEM — D50.9 IRON DEFICIENCY ANEMIA: Status: ACTIVE | Noted: 2024-04-05

## 2024-05-02 LAB — C DIF TOX TCDA+TCDB STL QL NAA+PROBE: NOT DETECTED

## 2024-05-02 PROCEDURE — 87493 C DIFF AMPLIFIED PROBE: CPT

## 2024-05-02 PROCEDURE — 87506 IADNA-DNA/RNA PROBE TQ 6-11: CPT

## 2024-05-02 RX ORDER — IPRATROPIUM BROMIDE AND ALBUTEROL SULFATE 2.5; .5 MG/3ML; MG/3ML
3 SOLUTION RESPIRATORY (INHALATION) EVERY 6 HOURS
COMMUNITY
Start: 2024-03-09

## 2024-05-03 ENCOUNTER — TELEPHONE (OUTPATIENT)
Dept: PRIMARY CARE | Facility: CLINIC | Age: 84
End: 2024-05-03
Payer: MEDICARE

## 2024-05-03 LAB

## 2024-05-03 NOTE — TELEPHONE ENCOUNTER
----- Message from NIMESH Parker sent at 5/3/2024 12:57 PM EDT -----  No infectious organisms detected in stool

## 2024-05-13 ENCOUNTER — LAB (OUTPATIENT)
Dept: LAB | Facility: LAB | Age: 84
End: 2024-05-13
Payer: MEDICARE

## 2024-05-13 DIAGNOSIS — R39.15 URINARY URGENCY: ICD-10-CM

## 2024-05-13 PROCEDURE — 87086 URINE CULTURE/COLONY COUNT: CPT

## 2024-05-14 LAB — BACTERIA UR CULT: NO GROWTH

## 2024-05-21 ENCOUNTER — APPOINTMENT (OUTPATIENT)
Dept: UROLOGY | Facility: CLINIC | Age: 84
End: 2024-05-21
Payer: MEDICARE

## 2024-05-28 ENCOUNTER — CLINICAL SUPPORT (OUTPATIENT)
Dept: PHYSICAL THERAPY | Facility: HOSPITAL | Age: 84
End: 2024-05-28
Payer: MEDICARE

## 2024-05-28 DIAGNOSIS — H81.11 BENIGN PAROXYSMAL VERTIGO, RIGHT EAR: ICD-10-CM

## 2024-05-28 PROCEDURE — 95992 CANALITH REPOSITIONING PROC: CPT | Mod: GP | Performed by: PHYSICAL THERAPIST

## 2024-05-28 PROCEDURE — 97161 PT EVAL LOW COMPLEX 20 MIN: CPT | Mod: GP | Performed by: PHYSICAL THERAPIST

## 2024-05-28 ASSESSMENT — PAIN SCALES - GENERAL: PAINLEVEL_OUTOF10: 0 - NO PAIN

## 2024-05-28 ASSESSMENT — PATIENT HEALTH QUESTIONNAIRE - PHQ9
SUM OF ALL RESPONSES TO PHQ9 QUESTIONS 1 AND 2: 0
2. FEELING DOWN, DEPRESSED OR HOPELESS: NOT AT ALL
1. LITTLE INTEREST OR PLEASURE IN DOING THINGS: NOT AT ALL

## 2024-05-28 ASSESSMENT — ENCOUNTER SYMPTOMS
DEPRESSION: 0
OCCASIONAL FEELINGS OF UNSTEADINESS: 0
LOSS OF SENSATION IN FEET: 0

## 2024-05-28 ASSESSMENT — PAIN - FUNCTIONAL ASSESSMENT: PAIN_FUNCTIONAL_ASSESSMENT: 0-10

## 2024-05-28 NOTE — PROGRESS NOTES
"ED Provider Note    ER PROVIDER NOTE        CHIEF COMPLAINT  Chief Complaint   Patient presents with   • Suicidal Ideation     Pt presented with a complaint of Suicidal ideations. PT states that she would cut her wrists.        HPI  St Kelsea Rey is a 21 y.o. female who presents to the emergency department complaining of concerned she may cut herself.  Patient has a history of cutting in the past although no suicide attempt.  She reports she has been feeling increasingly depressed and feeling like cutting herself over the past few weeks, but denies any new stressor.  She states she \"feels suicidal\" and thinks she would cut her wrists.  She denies any attempt or ingestion.  No other focal medical complaints, denies any headache, chest pain, fevers chills    REVIEW OF SYSTEMS  Pertinent positives include feelings of self-harm. Pertinent negatives include no ingestion. See HPI for details. All other systems reviewed and are negative.    PAST MEDICAL HISTORY   has a past medical history of ADHD (attention deficit hyperactivity disorder); Allergy; Anemia; Asthma; Depression; Fear of needles; Gynecological disorder; H/O dizziness; History of fainting; Infectious disease; Low blood phosphate; and QT prolongation (1/2009).    SURGICAL HISTORY   has a past surgical history that includes dilation and curettage (12/13/2017).    FAMILY HISTORY  Family History   Problem Relation Age of Onset   • Cancer Mother         Thyroid CA   • Hypertension Father        SOCIAL HISTORY  Social History     Social History   • Marital status: Single     Spouse name: N/A   • Number of children: N/A   • Years of education: N/A     Social History Main Topics   • Smoking status: Current Every Day Smoker     Packs/day: 0.25     Years: 3.00     Types: Cigarettes   • Smokeless tobacco: Never Used   • Alcohol use Yes      Comment: 1 per month   • Drug use: No   • Sexual activity: Not on file     Other Topics Concern   • Not on file " Physical Therapy    Physical Therapy Evaluation and Treatment      Patient Name: Micaela Leos  MRN: [id number]  : 1940   Today's Date: 2024  Time Calculation  Start Time: 1100  Stop Time: 1145  Time Calculation (min): 45 min     PT Evaluation Time Entry  PT Evaluation (Low) Time Entry: 25     PT Modalities Time Entry  Canalith Repositioning Time Entry: 20      Visit # 1    Assessment: S/s consistent with right-sided posterior canalithiasis. She was taken through a right Epley maneuver three times with diminishing symptoms.  PT Assessment Results: Decreased mobility (Dizziness)  Rehab Prognosis: Excellent    Plan:   Treatment/Interventions: Canalith repositioning, Education/ Instruction, Therapeutic exercises, Neuromuscular re-education  PT Plan: Skilled PT  PT Frequency: Follow-up visit only  Duration: 3-4 wks  Rehab Potential: Excellent  Plan of Care Agreement: Patient    Current Problem:   1. Benign paroxysmal vertigo, right ear  Referral to Physical Therapy    Follow Up In Physical Therapy          Subjective      Chief complaint: Pt states when she lays down she experiences a light-headedness (denies room spinning) that lasts a few minutes. Denies tinnitus or head/ear/neck pain. States she tried some head motion exercises that sound like habituation exercises given to her by Dr. Galvez, with maybe some intermittent benefit. No problems throughout her day unless she lays downs.     Pain Better: NA    Pain Worse: NA    Imaging: (-)    Prior level of function: independent, (+) drive    Current limitations: none    Home setup: lives alone in ranch house, no stairs    Work: retired    Patient's goal: get rid of dizziness    Precautions:  Precautions  Precautions Comment: None    Pain:  Pain Assessment  Pain Assessment: 0-10  Pain Score: 0 - No pain    Objective:  Objective   Cervical AROM: WNL    Georgi-Hallpike: (+) R with upward and torsional nystagmus lasting ~15 seconds; (-) L  Roll test: (+) lelia for  "    Social History Narrative   • No narrative on file      History   Drug Use No       CURRENT MEDICATIONS  Home Medications     Reviewed by Leroy Angeles R.N. (Registered Nurse) on 03/24/19 at 2014  Med List Status: Not Addressed   Medication Last Dose Status   albuterol (PROAIR HFA) 108 (90 Base) MCG/ACT Aero Soln inhalation aerosol  Active   ciclopirox (LOPROX) 0.77 % cream  Active   fluocinonide (LIDEX) 0.05 % gel  Active   fluticasone (FLONASE) 50 MCG/ACT nasal spray  Active   Norethin-Eth Estrad-Fe Biphas (LO LOESTRIN FE PO)  Active   norethindrone-ethinyl estradiol (JUNEL 1/20) 1-20 MG-MCG per tablet 3/24/2019 Active                ALLERGIES  Allergies   Allergen Reactions   • Latex Hives, Rash and Itching     Rash at contact site       PHYSICAL EXAM  VITAL SIGNS: /98   Pulse (!) 130   Temp 37.1 °C (98.8 °F) (Temporal)   Resp 16   Ht 1.803 m (5' 11\")   Wt 50 kg (110 lb 3.7 oz)   LMP 06/01/2018   SpO2 98%   BMI 15.37 kg/m²   Pulse ox interpretation: I interpret this pulse ox as normal.    Constitutional: Alert in no apparent distress.  HENT: No signs of trauma, Bilateral external ears normal, Nose normal.   Eyes: Pupils are equal and reactive, Conjunctiva normal, Non-icteric.   Neck: Normal range of motion, No tenderness, Supple, No stridor.   Lymphatic: No lymphadenopathy noted.   Cardiovascular: Regular rate and rhythm, no murmurs.   Thorax & Lungs: Normal breath sounds, No respiratory distress, No wheezing, No chest tenderness.   Abdomen: Bowel sounds normal, Soft, No tenderness, No masses, No pulsatile masses. No peritoneal signs.  Skin: Warm, Dry, No erythema, No rash.   Back: No bony tenderness, No CVA tenderness.   Extremities: Intact distal pulses, No edema, No tenderness, No cyanosis,Negative Ginny's sign.  Musculoskeletal: Good range of motion in all major joints. No tenderness to palpation or major deformities noted.   Neurologic: Alert , Normal motor function, Normal sensory " faint upward-beating nystagmus lasting about 10 seconds, worse on the right    Outcome Measures:  Other Measures  Dizziness Handicap Inventory: 14     Treatments:  EXERCISES       Date Date 5/30/2024       VISIT# #1 # # #    REPS REPS REPS REPS          Right Epley 3X                                                                                          HEP            Goals:  Active       Right BPPV       Pt will deny positional vertigo for improved bed mobility.        Start:  05/28/24    Expected End:  08/26/24            Improve DHI score >=12 points to decrease risk of falling.         Start:  05/28/24    Expected End:  08/26/24               function, No focal deficits noted.   Psychiatric: Poor eye contact, depressed     DIAGNOSTIC STUDIES / PROCEDURES        LABS  Labs Reviewed   POC BREATHALIZER - Abnormal; Notable for the following:        Result Value    POC Breathalizer 0.024 (*)     All other components within normal limits   URINE DRUG SCREEN   HCG QUALITATIVE UR   REFRACTOMETER SG       All labs reviewed by me.    RADIOLOGY  No orders to display     The radiologist's interpretation of all radiological studies have been reviewed by me.    COURSE & MEDICAL DECISION MAKING  Nursing notes, VS, PMSFHx reviewed in chart.    8:57 PM Patient seen and examined at bedside. . Ordered for breathalyzer, urine drug screen, life skills evaluation to evaluate her symptoms.     9:15 PM  Discussed case with life skills, patient has significant prior history including recent hospitalization, will plan for continuation of legal hold and transfer to inpatient psychiatric facility      Decision Making:  This is a 21 y.o. female presented with suicidal ideation.  Patient has had long-standing psychiatric history and appears decompensated at this time.  She does have a clear plan, and at this time does not appear to have the outpatient resources to help her through this acute decompensation.  Legal hold has been initiated, she has been medically cleared, pending transfer to inpatient psychiatric facility      Patient's care will be signed out to Dr. her ran pending inpatient psychiatric evaluation and transfer    FINAL IMPRESSION  1. Suicidal ideation         The note accurately reflects work and decisions made by me.  Carlos Rodriguez  3/25/2019  12:41 AM

## 2024-06-07 ENCOUNTER — HOSPITAL ENCOUNTER (OUTPATIENT)
Dept: RADIOLOGY | Facility: CLINIC | Age: 84
Discharge: HOME | End: 2024-06-07
Payer: MEDICARE

## 2024-06-07 DIAGNOSIS — H90.A22 SENSORINEURAL HEARING LOSS, UNILATERAL, LEFT EAR, WITH RESTRICTED HEARING ON THE CONTRALATERAL SIDE: ICD-10-CM

## 2024-06-07 PROCEDURE — A9575 INJ GADOTERATE MEGLUMI 0.1ML: HCPCS | Performed by: OTOLARYNGOLOGY

## 2024-06-07 PROCEDURE — 70553 MRI BRAIN STEM W/O & W/DYE: CPT

## 2024-06-07 PROCEDURE — 2550000001 HC RX 255 CONTRASTS: Performed by: OTOLARYNGOLOGY

## 2024-06-07 RX ORDER — GADOTERATE MEGLUMINE 376.9 MG/ML
0.2 INJECTION INTRAVENOUS
Status: COMPLETED | OUTPATIENT
Start: 2024-06-07 | End: 2024-06-07

## 2024-06-07 RX ADMIN — GADOTERATE MEGLUMINE 10 ML: 376.9 INJECTION INTRAVENOUS at 14:33

## 2024-06-11 ENCOUNTER — TREATMENT (OUTPATIENT)
Dept: PHYSICAL THERAPY | Facility: HOSPITAL | Age: 84
End: 2024-06-11
Payer: MEDICARE

## 2024-06-11 DIAGNOSIS — H81.11 BENIGN PAROXYSMAL VERTIGO, RIGHT EAR: ICD-10-CM

## 2024-06-11 PROCEDURE — 97110 THERAPEUTIC EXERCISES: CPT | Mod: GP | Performed by: PHYSICAL THERAPIST

## 2024-06-11 PROCEDURE — 95992 CANALITH REPOSITIONING PROC: CPT | Mod: GP | Performed by: PHYSICAL THERAPIST

## 2024-06-11 ASSESSMENT — PAIN - FUNCTIONAL ASSESSMENT: PAIN_FUNCTIONAL_ASSESSMENT: 0-10

## 2024-06-11 ASSESSMENT — PAIN SCALES - GENERAL: PAINLEVEL_OUTOF10: 0 - NO PAIN

## 2024-06-11 ASSESSMENT — ENCOUNTER SYMPTOMS
OCCASIONAL FEELINGS OF UNSTEADINESS: 0
DEPRESSION: 0
LOSS OF SENSATION IN FEET: 0

## 2024-06-11 NOTE — PROGRESS NOTES
Physical Therapy    Physical Therapy Treatment    Patient Name: Micaela Leos  MRN: [id number]  : 1940   Today's Date: 2024  Time Calculation  Start Time: 1030  Stop Time: 1110  Time Calculation (min): 40 min       PT Therapeutic Procedures Time Entry  Therapeutic Exercise Time Entry: 25  PT Modalities Time Entry  Canalith Repositioning Time Entry: 15      Visit #2    Assessment:   Pt again has a positive right Hallpike that is eliminated after three consecutive Epley maneuvers. She was given the same home-going instructions and will follow-up with me in a week.     Plan:   Pt may need a modified Semont maneuver if the Epley remains of short-term benefit only. If canalith repositioning remains unsuccessful, habituation exercises may be appropriate.    Current Problem  1. Benign paroxysmal vertigo, right ear  Follow Up In Physical Therapy          Subjective   General  Pt states she felt better for 2-3 days after PT but then back to normal. Continues to feel brief dizziness when laying down in bed at night. She states (as does her daughter who is present today) that her balance is good and they are not concerned about that. Uses a cane sometimes as needed.       Precautions  Precautions  STEADI Fall Risk Score (The score of 4 or more indicates an increased risk of falling): 3  Precautions Comment: None    Pain  Pain Assessment: 0-10  Pain Score: 0 - No pain    Objective   [place]-Hallpike: (+) R with upward and torsional nystagmus lasting ~15 seconds; (-) L  Roll test: (+) lelia for c/o mild dizziness lasting ~15 sec, R>L, no nystagmus observed      Treatments:  EXERCISES       Date Date 2024      VISIT# #1 #2 # #    REPS REPS REPS REPS          Right Epley 3X 3X                   BPPV reassess  '                                                                    HEP         Goals:  Active       Right BPPV       Pt will deny positional vertigo for improved bed mobility.        Start:  24     Expected End:  08/26/24            Improve DHI score >=12 points to decrease risk of falling.         Start:  05/28/24    Expected End:  08/26/24

## 2024-06-24 ENCOUNTER — TREATMENT (OUTPATIENT)
Dept: PHYSICAL THERAPY | Facility: HOSPITAL | Age: 84
End: 2024-06-24
Payer: MEDICARE

## 2024-06-24 DIAGNOSIS — H81.11 BENIGN PAROXYSMAL VERTIGO, RIGHT EAR: Primary | ICD-10-CM

## 2024-06-24 PROCEDURE — 95992 CANALITH REPOSITIONING PROC: CPT | Mod: GP | Performed by: PHYSICAL THERAPIST

## 2024-06-24 PROCEDURE — 97110 THERAPEUTIC EXERCISES: CPT | Mod: GP | Performed by: PHYSICAL THERAPIST

## 2024-06-24 ASSESSMENT — PAIN SCALES - GENERAL: PAINLEVEL_OUTOF10: 0 - NO PAIN

## 2024-06-24 ASSESSMENT — PAIN - FUNCTIONAL ASSESSMENT: PAIN_FUNCTIONAL_ASSESSMENT: 0-10

## 2024-06-24 NOTE — PROGRESS NOTES
Physical Therapy    Physical Therapy Treatment / Discharge    Patient Name: Micaela Leos  MRN: [id number]  : 1940   Today's Date: 2024  Time Calculation  Start Time: 1445  Stop Time: 1530  Time Calculation (min): 45 min       PT Therapeutic Procedures Time Entry  Therapeutic Exercise Time Entry: 25  PT Modalities Time Entry  Canalith Repositioning Time Entry: 20       Visit #3    Assessment:   Pt continues to have a positive right Hallpike that is not responding to the Epley maneuver. Took her three times through a right modified Semont maneuver and she tolerated it well. I encouraged her to work on this independently as well as a return to the habituation exercises she had been doing prior to PT. She has a follow-up with Dr. Galvez soon so I encouraged her to continue with the Semont and habituation exercises until she sees him.    Plan:   Pt unlikely to benefit from further PT as she has no interest in general balance retraining as she feels this has improved. Her positional vertigo will be worked on with her HEP and she will call PT with any questions or concerns. She will follow-up with Dr. Galvez soon. Discharge from PT at this time.     Current Problem  1. Benign paroxysmal vertigo, right ear  Follow Up In Physical Therapy          Subjective   General  Pt states overall she knows she is better because she is driving and doing house work without problems. She does however still c/o lightheadedness when laying down and rolling, though she has been sleeping propped on two pillows. Maybe a little better after last PT treatment when treated with another right Epley maneuver three times.    Precautions  Precautions  STEADI Fall Risk Score (The score of 4 or more indicates an increased risk of falling): 3    Pain  Pain Assessment: 0-10  0-10 (Numeric) Pain Score: 0 - No pain    Objective   Georgi-Hallpike: (+) R with upward and torsional nystagmus lasting ~10 seconds; (-) L  Roll test: (+) right for c/o  mild dizziness lasting ~15 sec, no nystagmus observed; (-) L  Sidelying test (+) right    Head impulse test (+) lelia, R>L, minor and inconsistent    Other Measures  Dizziness Handicap Inventory: 16  Treatments:  EXERCISES       Date Date 5/30/2024 6/11/2024      VISIT# #1 #2 #6/24/2024  #    REPS REPS REPS REPS          Right Epley 3X 3X            Modified Right Semont Maneuver   3X                  BPPV reassess  25' 25'                                                                   HEP         Goals:  Resolved       Right BPPV       Pt will deny positional vertigo for improved bed mobility.  (Adequate for Discharge)       Start:  05/28/24    Expected End:  08/26/24            Improve DHI score >=12 points to decrease risk of falling.   (Adequate for Discharge)       Start:  05/28/24    Expected End:  08/26/24

## 2024-07-08 ENCOUNTER — APPOINTMENT (OUTPATIENT)
Dept: CARDIOLOGY | Facility: CLINIC | Age: 84
End: 2024-07-08
Payer: MEDICARE

## 2024-07-20 DIAGNOSIS — M81.0 OSTEOPOROSIS WITHOUT CURRENT PATHOLOGICAL FRACTURE, UNSPECIFIED OSTEOPOROSIS TYPE: ICD-10-CM

## 2024-07-23 RX ORDER — ALENDRONATE SODIUM 70 MG/1
70 TABLET ORAL
Qty: 12 TABLET | Refills: 3 | Status: SHIPPED | OUTPATIENT
Start: 2024-07-28

## 2024-09-20 ENCOUNTER — OFFICE VISIT (OUTPATIENT)
Dept: PRIMARY CARE | Facility: CLINIC | Age: 84
End: 2024-09-20
Payer: MEDICARE

## 2024-09-20 ENCOUNTER — APPOINTMENT (OUTPATIENT)
Dept: PRIMARY CARE | Facility: CLINIC | Age: 84
End: 2024-09-20
Payer: MEDICARE

## 2024-09-20 VITALS
SYSTOLIC BLOOD PRESSURE: 100 MMHG | BODY MASS INDEX: 20.06 KG/M2 | HEIGHT: 62 IN | HEART RATE: 68 BPM | WEIGHT: 109 LBS | DIASTOLIC BLOOD PRESSURE: 64 MMHG | OXYGEN SATURATION: 99 %

## 2024-09-20 DIAGNOSIS — M81.0 OSTEOPOROSIS WITHOUT CURRENT PATHOLOGICAL FRACTURE, UNSPECIFIED OSTEOPOROSIS TYPE: ICD-10-CM

## 2024-09-20 DIAGNOSIS — E78.2 MIXED HYPERLIPIDEMIA: Primary | ICD-10-CM

## 2024-09-20 DIAGNOSIS — I10 PRIMARY HYPERTENSION: ICD-10-CM

## 2024-09-20 DIAGNOSIS — M46.1 SACROILIITIS, NOT ELSEWHERE CLASSIFIED (CMS-HCC): ICD-10-CM

## 2024-09-20 DIAGNOSIS — Z00.00 HEALTH CARE MAINTENANCE: ICD-10-CM

## 2024-09-20 DIAGNOSIS — E55.9 VITAMIN D DEFICIENCY: ICD-10-CM

## 2024-09-20 DIAGNOSIS — E46 PROTEIN-CALORIE MALNUTRITION, UNSPECIFIED SEVERITY (MULTI): ICD-10-CM

## 2024-09-20 DIAGNOSIS — R39.15 URINARY URGENCY: ICD-10-CM

## 2024-09-20 DIAGNOSIS — E78.5 HYPERLIPIDEMIA, UNSPECIFIED HYPERLIPIDEMIA TYPE: ICD-10-CM

## 2024-09-20 DIAGNOSIS — F41.9 ANXIETY: ICD-10-CM

## 2024-09-20 PROCEDURE — 3078F DIAST BP <80 MM HG: CPT

## 2024-09-20 PROCEDURE — 1160F RVW MEDS BY RX/DR IN RCRD: CPT

## 2024-09-20 PROCEDURE — 1123F ACP DISCUSS/DSCN MKR DOCD: CPT

## 2024-09-20 PROCEDURE — 1157F ADVNC CARE PLAN IN RCRD: CPT

## 2024-09-20 PROCEDURE — 1170F FXNL STATUS ASSESSED: CPT

## 2024-09-20 PROCEDURE — 1159F MED LIST DOCD IN RCRD: CPT

## 2024-09-20 PROCEDURE — 99213 OFFICE O/P EST LOW 20 MIN: CPT

## 2024-09-20 PROCEDURE — 4004F PT TOBACCO SCREEN RCVD TLK: CPT

## 2024-09-20 PROCEDURE — 3074F SYST BP LT 130 MM HG: CPT

## 2024-09-20 RX ORDER — ALENDRONATE SODIUM 70 MG/1
70 TABLET ORAL
Qty: 12 TABLET | Refills: 3 | Status: SHIPPED | OUTPATIENT
Start: 2024-09-22

## 2024-09-20 RX ORDER — HYDROXYZINE HYDROCHLORIDE 25 MG/1
12.5 TABLET, FILM COATED ORAL NIGHTLY PRN
Qty: 15 TABLET | Refills: 2 | Status: SHIPPED | OUTPATIENT
Start: 2024-09-20 | End: 2025-09-20

## 2024-09-20 RX ORDER — ATORVASTATIN CALCIUM 20 MG/1
20 TABLET, FILM COATED ORAL DAILY
Qty: 90 TABLET | Refills: 3 | Status: SHIPPED | OUTPATIENT
Start: 2024-09-20 | End: 2025-09-20

## 2024-09-20 ASSESSMENT — ENCOUNTER SYMPTOMS
HEMATOLOGIC/LYMPHATIC NEGATIVE: 1
CARDIOVASCULAR NEGATIVE: 1
MUSCULOSKELETAL NEGATIVE: 1
LOSS OF SENSATION IN FEET: 0
NEUROLOGICAL NEGATIVE: 1
RESPIRATORY NEGATIVE: 1
ALLERGIC/IMMUNOLOGIC NEGATIVE: 1
EYES NEGATIVE: 1
SLEEP DISTURBANCE: 1
DEPRESSION: 0
GASTROINTESTINAL NEGATIVE: 1
ENDOCRINE NEGATIVE: 1
CONSTITUTIONAL NEGATIVE: 1
OCCASIONAL FEELINGS OF UNSTEADINESS: 0

## 2024-09-20 ASSESSMENT — ACTIVITIES OF DAILY LIVING (ADL)
MANAGING_FINANCES: INDEPENDENT
GROCERY_SHOPPING: INDEPENDENT
DOING_HOUSEWORK: INDEPENDENT
TAKING_MEDICATION: INDEPENDENT
DRESSING: INDEPENDENT
BATHING: INDEPENDENT

## 2024-09-20 NOTE — PATIENT INSTRUCTIONS
I am ordering labs for you to get when you are fasting (meaning nothing to eat or drink for at least 12 hours before, water and black coffee are okay). Once we get the results, someone from the office will call you. If you do not hear from someone within one week of having your blood drawn, please call us [phone] so that we can go over the results.    It is recommend that you consume a balanced diet to include: fruits, a variety of vegetables (dark green, red and orange, legumes like beans and peas, starch, other), grains (at least half of which are whole grains), fat-free or low-fat dairy including milk,cheese, or fortified soy beverages, and proteins such as lean means, poultry, fish, eggs, nuts, seeds.   Limit processed foods, saturated and trans fats, added sugars and sodium (salt).     Assessment/Plan   Problem List Items Addressed This Visit       Hyperlipemia - Primary    Relevant Orders    Lipid Panel    Hyperlipidemia    Relevant Medications    atorvastatin (Lipitor) 20 mg tablet    Other Relevant Orders    Lipid Panel    Osteoporosis    Relevant Medications    alendronate (Fosamax) 70 mg tablet (Start on 9/22/2024)    Vitamin D deficiency    Relevant Orders    Vitamin D 25-Hydroxy,Total (for eval of Vitamin D levels)     Other Visit Diagnoses       Primary hypertension        Relevant Orders    CBC and Auto Differential    Health care maintenance        Relevant Orders    CBC and Auto Differential    Basic Metabolic Panel    Lipid Panel    Hemoglobin A1C    Vitamin D 25-Hydroxy,Total (for eval of Vitamin D levels)    Vitamin B12    TSH with reflex to Free T4 if abnormal    Follow Up In [facility] - PCP - Medicare Annual    Anxiety        Relevant Medications    hydrOXYzine HCL (Atarax) 25 mg tablet                Ways to Help Prevent Falls at Home    Quick Tips   ? Ask for help if you need it. Most people want to help!   ? Get up slowly after sitting or laying down   ? Wear a medical alert  device or keep cell phone in your pocket   ? Use night lights, especially areas near a bathroom   ? Keep the items you use often within reach on a small stool or end table   ? Use an assistive device such as walker or cane, as directed by provider/physical therapy   ? Use a non-slip mat and grab bars in your bathroom. Look for home health sections for best options     Other Areas to Focus On   ? Exercise and nutrition: Regular exercise or taking a falls prevention class are great ways improve strength and balance. Don’t forget to stay hydrated and bring a snack!   ? Medicine side effects: Some medicines can make you sleepy or dizzy, which could cause a fall. Ask your healthcare provider about the side effects your medicines could cause. Be sure to let them know if you take any vitamins or supplements as well.   ? Tripping hazards: Remove items you could trip on, such as loose mats, rugs, cords, and clutter. Wear closed toe shoes with rubber soles.   ? Health and wellness: Get regular checkups with your healthcare provider, plus routine vision and hearing screenings. Talk with your healthcare provider about:   o Your medicines and the possible side effects - bring them in a bag if that is easier!   o Problems with balance or feeling dizzy   o Ways to promote bone health, such as Vitamin D and calcium supplements   o Questions or concerns about falling     *Ask your healthcare team if you have questions     ©[facility], 2022

## 2024-09-20 NOTE — PROGRESS NOTES
"Subjective   Patient ID: Micaela Leos is a 84 y.o. female who presents for Establish Care (Transfer from [facility] /Blood work and take something to sleep ).    Past Medical, Surgical, and Family History reviewed and updated in chart.     Reviewed all medications by prescribing practitioner or clinical pharmacist (such as prescriptions, OTCs, herbal therapies and supplements) and documented in the medical record.    HPI   Patient in office for follow up.  Has concerns about sleeping at night.  Takes melatonin nightly.  Goes to bed about 10pm and wakes up 3-6 times a night. Sometimes to urinate others not, has a difficult time going back to sleep.   Drinks 2 cups of coffee first time in the morning, then drinks water throughout the day.  Only has sips before bed.    CT low dose lung cancer screening delicned today but states will think about it and let me know at follow up,   Review of Systems   Constitutional: Negative.    HENT: Negative.     Eyes: Negative.    Respiratory: Negative.     Cardiovascular: Negative.    Gastrointestinal: Negative.    Endocrine: Negative.    Genitourinary: Negative.    Musculoskeletal: Negative.    Skin: Negative.    Allergic/Immunologic: Negative.    Neurological: Negative.    Hematological: Negative.    Psychiatric/Behavioral:  Positive for sleep disturbance.    All other systems reviewed and are negative.      Objective   /64   Pulse 68   Ht 1.575 m (5' 2.02\")   Wt 49.4 kg (109 lb)   SpO2 99%   BMI 19.92 kg/m²     Physical Exam  Constitutional:       Appearance: Normal appearance.   HENT:      Head: Normocephalic and atraumatic.      Nose: Nose normal.      Mouth/Throat:      Mouth: Mucous membranes are moist.      Pharynx: Oropharynx is clear.   Cardiovascular:      Rate and Rhythm: Normal rate and regular rhythm.      Pulses: Normal pulses.      Heart sounds: Normal heart sounds.   Pulmonary:      Effort: Pulmonary effort is normal.      Breath sounds: Normal breath " sounds.   Abdominal:      General: Bowel sounds are normal.      Palpations: Abdomen is soft.   Skin:     General: Skin is warm and dry.   Neurological:      General: No focal deficit present.      Mental Status: She is alert and oriented to person, place, and time.   Psychiatric:         Mood and Affect: Mood normal.         Behavior: Behavior normal.         Thought Content: Thought content normal.         Judgment: Judgment normal.         Assessment/Plan   Problem List Items Addressed This Visit       Hyperlipemia - Primary    Relevant Orders    Lipid Panel    Hyperlipidemia    Relevant Medications    atorvastatin (Lipitor) 20 mg tablet    Other Relevant Orders    Lipid Panel    Osteoporosis    Relevant Medications    alendronate (Fosamax) 70 mg tablet (Start on 9/22/2024)    Vitamin D deficiency    Relevant Orders    Vitamin D 25-Hydroxy,Total (for eval of Vitamin D levels)    Sacroiliitis, not elsewhere classified (CMS-HCC)    Protein-calorie malnutrition, unspecified severity (Multi)     Other Visit Diagnoses       Primary hypertension        Relevant Orders    CBC and Auto Differential    Health care maintenance        Relevant Orders    CBC and Auto Differential    Basic Metabolic Panel    Lipid Panel    Hemoglobin A1C    Vitamin D 25-Hydroxy,Total (for eval of Vitamin D levels)    Vitamin B12    TSH with reflex to Free T4 if abnormal    Follow Up In [facility] - PCP - Medicare Annual    Anxiety        Relevant Medications    hydrOXYzine HCL (Atarax) 25 mg tablet             Patient was identified as a fall risk. Risk prevention instructions provided.

## 2024-10-16 PROBLEM — R42 DIZZINESS: Status: ACTIVE | Noted: 2024-05-15

## 2024-10-23 RX ORDER — BRIMONIDINE TARTRATE AND TIMOLOL MALEATE 2; 5 MG/ML; MG/ML
SOLUTION OPHTHALMIC
COMMUNITY
Start: 2024-09-19

## 2024-11-01 ENCOUNTER — OFFICE VISIT (OUTPATIENT)
Dept: CARDIOLOGY | Facility: HOSPITAL | Age: 84
End: 2024-11-01
Payer: MEDICARE

## 2024-11-01 ENCOUNTER — APPOINTMENT (OUTPATIENT)
Dept: CARDIOLOGY | Facility: CLINIC | Age: 84
End: 2024-11-01
Payer: MEDICARE

## 2024-11-01 VITALS
OXYGEN SATURATION: 96 % | DIASTOLIC BLOOD PRESSURE: 70 MMHG | BODY MASS INDEX: 19.84 KG/M2 | WEIGHT: 112 LBS | SYSTOLIC BLOOD PRESSURE: 132 MMHG | HEIGHT: 63 IN | HEART RATE: 66 BPM

## 2024-11-01 DIAGNOSIS — I25.10 ASHD (ARTERIOSCLEROTIC HEART DISEASE): Primary | ICD-10-CM

## 2024-11-01 PROCEDURE — 1159F MED LIST DOCD IN RCRD: CPT | Performed by: INTERNAL MEDICINE

## 2024-11-01 PROCEDURE — 99213 OFFICE O/P EST LOW 20 MIN: CPT | Performed by: INTERNAL MEDICINE

## 2024-11-01 PROCEDURE — 1157F ADVNC CARE PLAN IN RCRD: CPT | Performed by: INTERNAL MEDICINE

## 2024-11-01 PROCEDURE — 1123F ACP DISCUSS/DSCN MKR DOCD: CPT | Performed by: INTERNAL MEDICINE

## 2024-11-01 PROCEDURE — 3078F DIAST BP <80 MM HG: CPT | Performed by: INTERNAL MEDICINE

## 2024-11-01 PROCEDURE — 1160F RVW MEDS BY RX/DR IN RCRD: CPT | Performed by: INTERNAL MEDICINE

## 2024-11-01 PROCEDURE — 3075F SYST BP GE 130 - 139MM HG: CPT | Performed by: INTERNAL MEDICINE

## 2024-11-01 ASSESSMENT — ENCOUNTER SYMPTOMS: VERTIGO: 1

## 2025-03-04 LAB
25(OH)D3+25(OH)D2 SERPL-MCNC: 44 NG/ML (ref 30–100)
ANION GAP SERPL CALCULATED.4IONS-SCNC: 10 MMOL/L (CALC) (ref 7–17)
BASOPHILS # BLD AUTO: 69 CELLS/UL (ref 0–200)
BASOPHILS NFR BLD AUTO: 0.9 %
BUN SERPL-MCNC: 12 MG/DL (ref 7–25)
BUN/CREAT SERPL: NORMAL (CALC) (ref 6–22)
CALCIUM SERPL-MCNC: 9.3 MG/DL (ref 8.6–10.4)
CHLORIDE SERPL-SCNC: 103 MMOL/L (ref 98–110)
CHOLEST SERPL-MCNC: 118 MG/DL
CHOLEST/HDLC SERPL: 2.4 (CALC)
CO2 SERPL-SCNC: 27 MMOL/L (ref 20–32)
CREAT SERPL-MCNC: 0.67 MG/DL (ref 0.6–0.95)
EGFRCR SERPLBLD CKD-EPI 2021: 86 ML/MIN/1.73M2
EOSINOPHIL # BLD AUTO: 262 CELLS/UL (ref 15–500)
EOSINOPHIL NFR BLD AUTO: 3.4 %
ERYTHROCYTE [DISTWIDTH] IN BLOOD BY AUTOMATED COUNT: 11.5 % (ref 11–15)
EST. AVERAGE GLUCOSE BLD GHB EST-MCNC: 108 MG/DL
EST. AVERAGE GLUCOSE BLD GHB EST-SCNC: 6 MMOL/L
GLUCOSE SERPL-MCNC: 98 MG/DL (ref 65–99)
HBA1C MFR BLD: 5.4 % OF TOTAL HGB
HCT VFR BLD AUTO: 38.8 % (ref 35–45)
HDLC SERPL-MCNC: 50 MG/DL
HGB BLD-MCNC: 12.7 G/DL (ref 11.7–15.5)
LDLC SERPL CALC-MCNC: 53 MG/DL (CALC)
LYMPHOCYTES # BLD AUTO: 2426 CELLS/UL (ref 850–3900)
LYMPHOCYTES NFR BLD AUTO: 31.5 %
MCH RBC QN AUTO: 32.5 PG (ref 27–33)
MCHC RBC AUTO-ENTMCNC: 32.7 G/DL (ref 32–36)
MCV RBC AUTO: 99.2 FL (ref 80–100)
MONOCYTES # BLD AUTO: 616 CELLS/UL (ref 200–950)
MONOCYTES NFR BLD AUTO: 8 %
NEUTROPHILS # BLD AUTO: 4327 CELLS/UL (ref 1500–7800)
NEUTROPHILS NFR BLD AUTO: 56.2 %
NONHDLC SERPL-MCNC: 68 MG/DL (CALC)
PLATELET # BLD AUTO: 235 THOUSAND/UL (ref 140–400)
PMV BLD REES-ECKER: 12.7 FL (ref 7.5–12.5)
POTASSIUM SERPL-SCNC: 4.2 MMOL/L (ref 3.5–5.3)
RBC # BLD AUTO: 3.91 MILLION/UL (ref 3.8–5.1)
SODIUM SERPL-SCNC: 140 MMOL/L (ref 135–146)
TRIGL SERPL-MCNC: 74 MG/DL
TSH SERPL-ACNC: 2.81 MIU/L (ref 0.4–4.5)
VIT B12 SERPL-MCNC: 375 PG/ML (ref 200–1100)
WBC # BLD AUTO: 7.7 THOUSAND/UL (ref 3.8–10.8)

## 2025-03-20 ENCOUNTER — APPOINTMENT (OUTPATIENT)
Dept: PRIMARY CARE | Facility: CLINIC | Age: 85
End: 2025-03-20
Payer: MEDICARE

## 2025-03-31 ENCOUNTER — APPOINTMENT (OUTPATIENT)
Dept: PRIMARY CARE | Facility: CLINIC | Age: 85
End: 2025-03-31
Payer: MEDICARE

## 2025-03-31 VITALS
BODY MASS INDEX: 21.53 KG/M2 | OXYGEN SATURATION: 98 % | HEART RATE: 54 BPM | WEIGHT: 117 LBS | DIASTOLIC BLOOD PRESSURE: 71 MMHG | HEIGHT: 62 IN | SYSTOLIC BLOOD PRESSURE: 106 MMHG

## 2025-03-31 DIAGNOSIS — Z00.00 ROUTINE GENERAL MEDICAL EXAMINATION AT HEALTH CARE FACILITY: Primary | ICD-10-CM

## 2025-03-31 DIAGNOSIS — R63.4 UNEXPLAINED WEIGHT LOSS: ICD-10-CM

## 2025-03-31 DIAGNOSIS — I10 BENIGN ESSENTIAL HYPERTENSION: ICD-10-CM

## 2025-03-31 DIAGNOSIS — E46 PROTEIN-CALORIE MALNUTRITION, UNSPECIFIED SEVERITY (MULTI): ICD-10-CM

## 2025-03-31 DIAGNOSIS — H90.3 ASYMMETRICAL SENSORINEURAL HEARING LOSS: ICD-10-CM

## 2025-03-31 DIAGNOSIS — Z00.00 HEALTH CARE MAINTENANCE: ICD-10-CM

## 2025-03-31 DIAGNOSIS — H40.9 GLAUCOMA OF BOTH EYES, UNSPECIFIED GLAUCOMA TYPE: ICD-10-CM

## 2025-03-31 DIAGNOSIS — Z00.00 MEDICARE ANNUAL WELLNESS VISIT, SUBSEQUENT: ICD-10-CM

## 2025-03-31 DIAGNOSIS — E78.5 HYPERLIPIDEMIA, UNSPECIFIED HYPERLIPIDEMIA TYPE: ICD-10-CM

## 2025-03-31 DIAGNOSIS — I10 PRIMARY HYPERTENSION: ICD-10-CM

## 2025-03-31 DIAGNOSIS — I25.10 ASHD (ARTERIOSCLEROTIC HEART DISEASE): ICD-10-CM

## 2025-03-31 DIAGNOSIS — J42 CHRONIC BRONCHITIS, UNSPECIFIED CHRONIC BRONCHITIS TYPE (MULTI): ICD-10-CM

## 2025-03-31 PROBLEM — L90.5 SCARRING: Status: RESOLVED | Noted: 2023-02-03 | Resolved: 2025-03-31

## 2025-03-31 PROBLEM — R07.89 ATYPICAL CHEST PAIN: Status: RESOLVED | Noted: 2023-02-03 | Resolved: 2025-03-31

## 2025-03-31 PROBLEM — M25.519 SHOULDER PAIN: Status: RESOLVED | Noted: 2023-02-03 | Resolved: 2025-03-31

## 2025-03-31 PROBLEM — H61.23 BILATERAL IMPACTED CERUMEN: Status: RESOLVED | Noted: 2023-02-03 | Resolved: 2025-03-31

## 2025-03-31 PROBLEM — M54.2 NECK PAIN: Status: RESOLVED | Noted: 2023-02-03 | Resolved: 2025-03-31

## 2025-03-31 PROBLEM — S29.9XXA RIB INJURY: Status: RESOLVED | Noted: 2023-02-03 | Resolved: 2025-03-31
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
## 2025-03-31 PROBLEM — M54.2 NECK PAIN ON LEFT SIDE: Status: RESOLVED | Noted: 2023-02-03 | Resolved: 2025-03-31

## 2025-03-31 PROBLEM — H61.21 HEARING LOSS DUE TO CERUMEN IMPACTION, RIGHT: Status: RESOLVED | Noted: 2023-02-03 | Resolved: 2025-03-31

## 2025-03-31 PROBLEM — H61.20 IMPACTED CERUMEN: Status: RESOLVED | Noted: 2024-03-14 | Resolved: 2025-03-31

## 2025-03-31 PROBLEM — R53.1 WEAKNESS: Status: RESOLVED | Noted: 2024-03-25 | Resolved: 2025-03-31

## 2025-03-31 PROBLEM — J06.9 VIRAL UPPER RESPIRATORY TRACT INFECTION: Status: RESOLVED | Noted: 2024-03-25 | Resolved: 2025-03-31

## 2025-03-31 PROBLEM — R29.898 LEG WEAKNESS: Status: RESOLVED | Noted: 2023-10-05 | Resolved: 2025-03-31

## 2025-03-31 PROBLEM — H81.11 BENIGN PAROXYSMAL VERTIGO, RIGHT EAR: Status: RESOLVED | Noted: 2024-05-28 | Resolved: 2025-03-31

## 2025-03-31 PROBLEM — M62.81 MUSCLE WEAKNESS OF EXTREMITY: Status: RESOLVED | Noted: 2023-09-25 | Resolved: 2025-03-31

## 2025-03-31 PROBLEM — R06.02 SHORTNESS OF BREATH: Status: RESOLVED | Noted: 2024-03-15 | Resolved: 2025-03-31
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
## 2025-03-31 PROBLEM — R42 DIZZINESS: Status: RESOLVED | Noted: 2024-05-15 | Resolved: 2025-03-31

## 2025-03-31 PROBLEM — R05.9 COUGH: Status: RESOLVED | Noted: 2023-02-03 | Resolved: 2025-03-31

## 2025-03-31 PROCEDURE — 4004F PT TOBACCO SCREEN RCVD TLK: CPT

## 2025-03-31 PROCEDURE — 3078F DIAST BP <80 MM HG: CPT

## 2025-03-31 PROCEDURE — 3074F SYST BP LT 130 MM HG: CPT

## 2025-03-31 PROCEDURE — G0439 PPPS, SUBSEQ VISIT: HCPCS

## 2025-03-31 PROCEDURE — 1160F RVW MEDS BY RX/DR IN RCRD: CPT

## 2025-03-31 PROCEDURE — 1157F ADVNC CARE PLAN IN RCRD: CPT

## 2025-03-31 PROCEDURE — 1170F FXNL STATUS ASSESSED: CPT

## 2025-03-31 PROCEDURE — 1123F ACP DISCUSS/DSCN MKR DOCD: CPT

## 2025-03-31 PROCEDURE — 1159F MED LIST DOCD IN RCRD: CPT

## 2025-03-31 RX ORDER — AMLODIPINE BESYLATE 5 MG/1
5 TABLET ORAL DAILY
Qty: 90 TABLET | Refills: 3 | Status: SHIPPED | OUTPATIENT
Start: 2025-03-31 | End: 2026-03-31

## 2025-03-31 RX ORDER — ATORVASTATIN CALCIUM 20 MG/1
20 TABLET, FILM COATED ORAL DAILY
Qty: 90 TABLET | Refills: 3 | Status: SHIPPED | OUTPATIENT
Start: 2025-03-31 | End: 2026-03-31

## 2025-03-31 ASSESSMENT — ENCOUNTER SYMPTOMS
PSYCHIATRIC NEGATIVE: 1
GASTROINTESTINAL NEGATIVE: 1
NEUROLOGICAL NEGATIVE: 1
OCCASIONAL FEELINGS OF UNSTEADINESS: 0
CARDIOVASCULAR NEGATIVE: 1
ENDOCRINE NEGATIVE: 1
LOSS OF SENSATION IN FEET: 0
EYES NEGATIVE: 1
CONSTITUTIONAL NEGATIVE: 1
MUSCULOSKELETAL NEGATIVE: 1
HEMATOLOGIC/LYMPHATIC NEGATIVE: 1
RESPIRATORY NEGATIVE: 1
ALLERGIC/IMMUNOLOGIC NEGATIVE: 1
DEPRESSION: 0

## 2025-03-31 ASSESSMENT — PATIENT HEALTH QUESTIONNAIRE - PHQ9
1. LITTLE INTEREST OR PLEASURE IN DOING THINGS: NOT AT ALL
SUM OF ALL RESPONSES TO PHQ9 QUESTIONS 1 AND 2: 0
2. FEELING DOWN, DEPRESSED OR HOPELESS: NOT AT ALL

## 2025-03-31 ASSESSMENT — ACTIVITIES OF DAILY LIVING (ADL)
GROCERY_SHOPPING: INDEPENDENT
DRESSING: INDEPENDENT
TAKING_MEDICATION: INDEPENDENT
DOING_HOUSEWORK: INDEPENDENT
MANAGING_FINANCES: INDEPENDENT
BATHING: INDEPENDENT

## 2025-03-31 NOTE — ASSESSMENT & PLAN NOTE
Lab work ordered  Bone density completed 12/12/2023 due 12/12/25  Declined pneumococcal vaccine today.   Mammogram-aged out  Colonscopsy-aged out  Tdap completed 4/11/2024  COVID last 10/5/23  Influenza last 9/7/22 declines update  Zoster 9/14/20, 6/22/20

## 2025-03-31 NOTE — ASSESSMENT & PLAN NOTE
Assessed, BP controlled, cholesterol levels controlled. Continues to be a current everyday smoker, education provided on cessation.     03/28/2024 - TTE  Left ventricular systolic function is normal with a 65% estimated ejection fraction.      08/21/2017 - Cardiac Catheterization (LH)  1. Mild to moderate coronary artery disease.  2. Normal LV systolic function.     07/21/2017 - Stress Test  1. Abnormal stress echocardiogram due to LAD territory ischemia at a low workload. Note: Hypertensive blood pressure response may reduce specificity of the exam.  2. Maximum Predicted Heart Rate: 90%. METs: 5.  3. Blood Pressure Response: Hypertensive.  4. Electrocardiogram Findings: Normal.  5. Test Ended Due To: Patient fatigue.  6. Stress Provoked: No symptoms.

## 2025-03-31 NOTE — PROGRESS NOTES
"Subjective   Reason for Visit: Micaela Leos is an 84 y.o. female here for a Medicare Wellness visit.     Past Medical, Surgical, and Family History reviewed and updated in chart.    Reviewed all medications by prescribing practitioner or clinical pharmacist (such as prescriptions, OTCs, herbal therapies and supplements) and documented in the medical record.    HPI  84 yof in office for medicare wellness. PMH of HTN, ASHD, osteoporosis, glaucoma.  Discussed lab work today at appointment.   No concerns.   Needs refills.     Had a fall a few months back, was at the [employer]. Fell forward. States did not pass out. Is unsure how or why fell. Does have life alert.  Denies gait concern. Still does all housework, shopping.     Patient Care Team:  NIMESH Lucia as PCP - General (Family Medicine)  NIMESH Parker as PCP - [employer] PCP  Hawk Boucher MD as Consulting Physician (Hematology and Oncology)     Review of Systems   Constitutional: Negative.    HENT: Negative.     Eyes: Negative.    Respiratory: Negative.     Cardiovascular: Negative.    Gastrointestinal: Negative.    Endocrine: Negative.    Genitourinary: Negative.    Musculoskeletal: Negative.    Skin: Negative.    Allergic/Immunologic: Negative.    Neurological: Negative.    Hematological: Negative.    Psychiatric/Behavioral: Negative.     All other systems reviewed and are negative.      Objective   Vitals:  /71   Pulse 54   Ht 1.575 m (5' 2.01\")   Wt 53.1 kg (117 lb)   SpO2 98%   BMI 21.39 kg/m²       Physical Exam  Constitutional:       Appearance: Normal appearance.   HENT:      Head: Normocephalic and atraumatic.      Nose: Nose normal.      Mouth/Throat:      Mouth: Mucous membranes are moist.      Pharynx: Oropharynx is clear.   Eyes:      Pupils: Pupils are equal, round, and reactive to light.   Cardiovascular:      Rate and Rhythm: Normal rate and regular rhythm.      Pulses: Normal " pulses.      Heart sounds: Normal heart sounds.   Pulmonary:      Effort: Pulmonary effort is normal.      Breath sounds: Normal breath sounds.   Abdominal:      General: Bowel sounds are normal.      Palpations: Abdomen is soft.   Musculoskeletal:         General: Normal range of motion.      Cervical back: Normal range of motion.   Skin:     General: Skin is warm and dry.   Neurological:      General: No focal deficit present.      Mental Status: She is alert and oriented to person, place, and time.   Psychiatric:         Mood and Affect: Mood normal.         Behavior: Behavior normal.         Thought Content: Thought content normal.         Judgment: Judgment normal.         Assessment & Plan  Health care maintenance    Orders:    Follow Up In [facility] - PCP - Medicare Annual    Follow Up In [facility] - PCP - Established; Future    Primary hypertension  Assessed and stable. BP in office today 106/71. Continue on amlodipine. Follows with cardiology.   Orders:    amLODIPine (Norvasc) 5 mg tablet; Take 1 tablet (5 mg) by mouth once daily.    Hyperlipidemia, unspecified hyperlipidemia type  Assessed and stable. Cholesterol WDL with lab work. Continues on atorvastatin 20mg daily. Follows with cardiology.   Orders:    atorvastatin (Lipitor) 20 mg tablet; Take 1 tablet (20 mg) by mouth once daily.    Routine general medical examination at health care facility    Orders:    1 Year Follow Up In [facility] - PCP - Wellness Exam; Future    Benign essential hypertension  Assessed and stable. BP in office today 106/71. Continue on amlodipine. Follows with cardiology.        ASHD (arteriosclerotic heart disease)  Assessed, BP controlled, cholesterol levels controlled. Continues to be a current everyday smoker, education provided on cessation.     03/28/2024 - TTE  Left ventricular systolic function is normal with a 65% estimated ejection fraction.      08/21/2017 - Cardiac Catheterization  (LH)  1. Mild to moderate coronary artery disease.  2. Normal LV systolic function.     07/21/2017 - Stress Test  1. Abnormal stress echocardiogram due to LAD territory ischemia at a low workload. Note: Hypertensive blood pressure response may reduce specificity of the exam.  2. Maximum Predicted Heart Rate: 90%. METs: 5.  3. Blood Pressure Response: Hypertensive.  4. Electrocardiogram Findings: Normal.  5. Test Ended Due To: Patient fatigue.  6. Stress Provoked: No symptoms.        Unexplained weight loss  Patients weight is up, 117lbs. Bmi 21.39. UP 5lbs since last visit.        Protein-calorie malnutrition, unspecified severity (Multi)  Patients weight is up, 117lbs. Bmi 21.39. UP 5lbs since last visit. Protein levels WDL, patient states is eating without difficulty.        Chronic bronchitis, unspecified chronic bronchitis type (Multi)  Condition assessed and stable. Endorses does not use duo-neb treatments. No concerns.        Glaucoma of both eyes, unspecified glaucoma type  Follows with eye doctor 4 times yearly. Uses eye drops twice daily.        Medicare annual wellness visit, subsequent  Lab work ordered  Bone density completed 12/12/2023 due 12/12/25  Declined pneumococcal vaccine today.   Mammogram-aged out  Colonscopsy-aged out  Tdap completed 4/11/2024  COVID last 10/5/23  Influenza last 9/7/22 declines update  Zoster 9/14/20, 6/22/20       Asymmetrical sensorineural hearing loss  Follows with ENT. Had MRI 6/7/24   IMPRESSION:  1. Unremarkable MRI of the internal auditory canals/inner ear  structures.  2. Few nonspecific foci of signal abnormality within the cerebral  hemispheric white matter which may be sequela of very mild chronic  small vessel ischemic changes.                Advance Directives Discussion  16 - 20 minutes were spent discussing Advanced Care Planning (including a Living Will, Medical Power Of , as well as specific end of life choices and/or directives). The details of that  discussion were documented in Advanced Directives Discussion section of the medical record.     Alcohol Use Counseling  15 - 20 minutes were spent counseling the patient and offering support/resources on alcohol use disorder.    Cardiac Risk Assessment  15 - 20 minutes were spent discussing Cardiovascular risk and, if needed, lifestyle modifications were recommended, including nutritional choices, exercise, and elimination of habits contributing to risk.   Aspirin use/disuse was discussed following the guidelines below:  low dose ASA ( mg) should be considered:    If prior Heart Attack/Stroke/Peripheral vascular disease:  Generally recommend daily low dose aspirin unless extremely high bleeding risk (e.g., gastrointestinal).    If no prior Heart Attack/Stroke/Peripheral vascular disease:              Age over 70: Do not use Aspirin for prevention    Age less than 70 and 10-year cardiovascular disease risk is >20%: use low dose Aspirin for prevention.                 Depression Screening  5 - 10 minutes were spent screening for depression.    Low Dose CT Screening  We discussed the pros and cons of low dose CT screening for lung cancer based on the patient's smoking history.  This screening is recommended for patients who:  Are between the age of 50 to 77  Have a 20 pack-year smoking history (20 years of smoking a pack a day)  Are either a current smoker or have quit smoking within the last 15 years  Are in good health - no new cough or unexplained weight loss  Are willing to do the follow-up testing and treatment, if needed  Have not had a chest CT (CAT) scan in the last year    Tobacco Counseling  3 - 5 minutes were spent counseling the patient on tobacco cessation.  Benefits of cessation were discussed as well as techniques to help quit.    Patient was identified as a fall risk. Risk prevention instructions provided.

## 2025-03-31 NOTE — ASSESSMENT & PLAN NOTE
Patients weight is up, 117lbs. Bmi 21.39. UP 5lbs since last visit. Protein levels WDL, patient states is eating without difficulty.

## 2025-03-31 NOTE — ASSESSMENT & PLAN NOTE
Assessed and stable. Cholesterol WDL with lab work. Continues on atorvastatin 20mg daily. Follows with cardiology.   Orders:    atorvastatin (Lipitor) 20 mg tablet; Take 1 tablet (20 mg) by mouth once daily.

## 2025-03-31 NOTE — ASSESSMENT & PLAN NOTE
Assessed and stable. BP in office today 106/71. Continue on amlodipine. Follows with cardiology.

## 2025-03-31 NOTE — ASSESSMENT & PLAN NOTE
Follows with ENT. Had MRI 6/7/24   IMPRESSION:  1. Unremarkable MRI of the internal auditory canals/inner ear  structures.  2. Few nonspecific foci of signal abnormality within the cerebral  hemispheric white matter which may be sequela of very mild chronic  small vessel ischemic changes.

## 2025-03-31 NOTE — PATIENT INSTRUCTIONS
Assessment & Plan  Health care maintenance    Orders:    Follow Up In [facility] - PCP - Medicare Annual    Follow Up In [facility] - PCP - Established; Future    Primary hypertension  Assessed and stable. BP in office today 106/71. Continue on amlodipine. Follows with cardiology.   Orders:    amLODIPine (Norvasc) 5 mg tablet; Take 1 tablet (5 mg) by mouth once daily.    Hyperlipidemia, unspecified hyperlipidemia type  Assessed and stable. Cholesterol WDL with lab work. Continues on atorvastatin 20mg daily. Follows with cardiology.   Orders:    atorvastatin (Lipitor) 20 mg tablet; Take 1 tablet (20 mg) by mouth once daily.    Routine general medical examination at health care facility    Orders:    1 Year Follow Up In [facility] - PCP - Wellness Exam; Future    Benign essential hypertension  Assessed and stable. BP in office today 106/71. Continue on amlodipine. Follows with cardiology.        ASHD (arteriosclerotic heart disease)  Assessed, BP controlled, cholesterol levels controlled. Continues to be a current everyday smoker, education provided on cessation.     03/28/2024 - TTE  Left ventricular systolic function is normal with a 65% estimated ejection fraction.      08/21/2017 - Cardiac Catheterization (LH)  1. Mild to moderate coronary artery disease.  2. Normal LV systolic function.     07/21/2017 - Stress Test  1. Abnormal stress echocardiogram due to LAD territory ischemia at a low workload. Note: Hypertensive blood pressure response may reduce specificity of the exam.  2. Maximum Predicted Heart Rate: 90%. METs: 5.  3. Blood Pressure Response: Hypertensive.  4. Electrocardiogram Findings: Normal.  5. Test Ended Due To: Patient fatigue.  6. Stress Provoked: No symptoms.        Unexplained weight loss  Patients weight is up, 117lbs. Bmi 21.39. UP 5lbs since last visit.        Protein-calorie malnutrition, unspecified severity (Multi)  Patients weight is up, 117lbs. Bmi  21.39. UP 5lbs since last visit. Protein levels WDL, patient states is eating without difficulty.        Chronic bronchitis, unspecified chronic bronchitis type (Multi)  Condition assessed and stable. Endorses does not use duo-neb treatments. No concerns.        Glaucoma of both eyes, unspecified glaucoma type  Follows with eye doctor 4 times yearly. Uses eye drops twice daily.        Medicare annual wellness visit, subsequent  Lab work ordered  Bone density completed 12/12/2023 due 12/12/25  Declined pneumococcal vaccine today.   Mammogram-aged out  Colonscopsy-aged out  Tdap completed 4/11/2024  COVID last 10/5/23  Influenza last 9/7/22 declines update  Zoster 9/14/20, 6/22/20       Asymmetrical sensorineural hearing loss  Follows with ENT. Had MRI 6/7/24   IMPRESSION:  1. Unremarkable MRI of the internal auditory canals/inner ear  structures.  2. Few nonspecific foci of signal abnormality within the cerebral  hemispheric white matter which may be sequela of very mild chronic  small vessel ischemic changes.                Advance Directives Discussion  16 - 20 minutes were spent discussing Advanced Care Planning (including a Living Will, Medical Power Of , as well as specific end of life choices and/or directives). The details of that discussion were documented in Advanced Directives Discussion section of the medical record.     Alcohol Use Counseling  15 - 20 minutes were spent counseling the patient and offering support/resources on alcohol use disorder.    Cardiac Risk Assessment  15 - 20 minutes were spent discussing Cardiovascular risk and, if needed, lifestyle modifications were recommended, including nutritional choices, exercise, and elimination of habits contributing to risk.   Aspirin use/disuse was discussed following the guidelines below:  low dose ASA ( mg) should be considered:    If prior Heart Attack/Stroke/Peripheral vascular disease:  Generally recommend daily low dose aspirin  unless extremely high bleeding risk (e.g., gastrointestinal).    If no prior Heart Attack/Stroke/Peripheral vascular disease:              Age over 70: Do not use Aspirin for prevention    Age less than 70 and 10-year cardiovascular disease risk is >20%: use low dose Aspirin for prevention.                 Depression Screening  5 - 10 minutes were spent screening for depression.    Low Dose CT Screening  We discussed the pros and cons of low dose CT screening for lung cancer based on the patient's smoking history.  This screening is recommended for patients who:  Are between the age of 50 to 77  Have a 20 pack-year smoking history (20 years of smoking a pack a day)  Are either a current smoker or have quit smoking within the last 15 years  Are in good health - no new cough or unexplained weight loss  Are willing to do the follow-up testing and treatment, if needed  Have not had a chest CT (CAT) scan in the last year    Tobacco Counseling  3 - 5 minutes were spent counseling the patient on tobacco cessation.  Benefits of cessation were discussed as well as techniques to help quit.    Patient was identified as a fall risk. Risk prevention instructions provided.      Ways to Help Prevent Falls at Home    Quick Tips   ? Ask for help if you need it. Most people want to help!   ? Get up slowly after sitting or laying down   ? Wear a medical alert device or keep cell phone in your pocket   ? Use night lights, especially areas near a bathroom   ? Keep the items you use often within reach on a small stool or end table   ? Use an assistive device such as walker or cane, as directed by provider/physical therapy   ? Use a non-slip mat and grab bars in your bathroom. Look for home health sections for best options     Other Areas to Focus On   ? Exercise and nutrition: Regular exercise or taking a falls prevention class are great ways improve strength and balance. Don’t forget to stay hydrated and bring a snack!   ? Medicine side  effects: Some medicines can make you sleepy or dizzy, which could cause a fall. Ask your healthcare provider about the side effects your medicines could cause. Be sure to let them know if you take any vitamins or supplements as well.   ? Tripping hazards: Remove items you could trip on, such as loose mats, rugs, cords, and clutter. Wear closed toe shoes with rubber soles.   ? Health and wellness: Get regular checkups with your healthcare provider, plus routine vision and hearing screenings. Talk with your healthcare provider about:   o Your medicines and the possible side effects - bring them in a bag if that is easier!   o Problems with balance or feeling dizzy   o Ways to promote bone health, such as Vitamin D and calcium supplements   o Questions or concerns about falling     *Ask your healthcare team if you have questions     ©[facility], 2022

## 2025-09-30 ENCOUNTER — APPOINTMENT (OUTPATIENT)
Dept: PRIMARY CARE | Facility: CLINIC | Age: 85
End: 2025-09-30
Payer: MEDICARE

## 2026-03-31 ENCOUNTER — APPOINTMENT (OUTPATIENT)
Dept: PRIMARY CARE | Facility: CLINIC | Age: 86
End: 2026-03-31
Payer: MEDICARE